# Patient Record
Sex: FEMALE | Race: WHITE | Employment: FULL TIME | URBAN - METROPOLITAN AREA
[De-identification: names, ages, dates, MRNs, and addresses within clinical notes are randomized per-mention and may not be internally consistent; named-entity substitution may affect disease eponyms.]

---

## 2018-09-16 ENCOUNTER — APPOINTMENT (OUTPATIENT)
Dept: CT IMAGING | Age: 23
End: 2018-09-16
Attending: STUDENT IN AN ORGANIZED HEALTH CARE EDUCATION/TRAINING PROGRAM
Payer: COMMERCIAL

## 2018-09-16 ENCOUNTER — HOSPITAL ENCOUNTER (EMERGENCY)
Age: 23
Discharge: HOME | End: 2018-09-16
Attending: STUDENT IN AN ORGANIZED HEALTH CARE EDUCATION/TRAINING PROGRAM
Payer: COMMERCIAL

## 2018-09-16 ENCOUNTER — APPOINTMENT (OUTPATIENT)
Dept: GENERAL RADIOLOGY | Age: 23
End: 2018-09-16
Attending: STUDENT IN AN ORGANIZED HEALTH CARE EDUCATION/TRAINING PROGRAM
Payer: COMMERCIAL

## 2018-09-16 VITALS
WEIGHT: 293 LBS | HEART RATE: 57 BPM | BODY MASS INDEX: 41.02 KG/M2 | HEIGHT: 71 IN | TEMPERATURE: 97.9 F | RESPIRATION RATE: 16 BRPM | SYSTOLIC BLOOD PRESSURE: 152 MMHG | DIASTOLIC BLOOD PRESSURE: 64 MMHG | OXYGEN SATURATION: 98 %

## 2018-09-16 DIAGNOSIS — G43.809 OTHER MIGRAINE WITHOUT STATUS MIGRAINOSUS, NOT INTRACTABLE: ICD-10-CM

## 2018-09-16 DIAGNOSIS — R55 SYNCOPE AND COLLAPSE: Primary | ICD-10-CM

## 2018-09-16 LAB
ALBUMIN SERPL-MCNC: 3.5 G/DL (ref 3.5–5)
ALBUMIN/GLOB SERPL: 0.9 {RATIO}
ALP SERPL-CCNC: 90 U/L (ref 50–130)
ALT SERPL-CCNC: 19 U/L (ref 12–65)
ANION GAP SERPL CALC-SCNC: 9 MMOL/L
AST SERPL-CCNC: 7 U/L (ref 15–37)
BILIRUB SERPL-MCNC: 0.1 MG/DL (ref 0.2–1.1)
BUN SERPL-MCNC: 12 MG/DL (ref 6–23)
CALCIUM SERPL-MCNC: 9.1 MG/DL (ref 8.3–10.4)
CHLORIDE SERPL-SCNC: 106 MMOL/L (ref 98–107)
CO2 SERPL-SCNC: 26 MMOL/L (ref 21–32)
CREAT SERPL-MCNC: 0.77 MG/DL (ref 0.6–1)
ERYTHROCYTE [DISTWIDTH] IN BLOOD BY AUTOMATED COUNT: 17.3 %
GLOBULIN SER CALC-MCNC: 3.7 G/DL (ref 2.3–3.5)
GLUCOSE SERPL-MCNC: 91 MG/DL (ref 65–100)
HCG UR QL: NEGATIVE
HCT VFR BLD AUTO: 37.5 % (ref 35.8–46.3)
HGB BLD-MCNC: 11.4 G/DL (ref 11.7–15.4)
MAGNESIUM SERPL-MCNC: 1.9 MG/DL (ref 1.8–2.4)
MCH RBC QN AUTO: 24.3 PG (ref 26.1–32.9)
MCHC RBC AUTO-ENTMCNC: 30.4 G/DL (ref 31.4–35)
MCV RBC AUTO: 80 FL (ref 79.6–97.8)
NRBC # BLD: 0 K/UL (ref 0–0.2)
PLATELET # BLD AUTO: 280 K/UL (ref 150–450)
PMV BLD AUTO: 11.2 FL (ref 9.4–12.3)
POTASSIUM SERPL-SCNC: 3.8 MMOL/L (ref 3.5–5.1)
PROT SERPL-MCNC: 7.2 G/DL
RBC # BLD AUTO: 4.69 M/UL (ref 4.05–5.2)
SODIUM SERPL-SCNC: 141 MMOL/L (ref 136–145)
TSH SERPL DL<=0.005 MIU/L-ACNC: 1.76 UIU/ML
WBC # BLD AUTO: 10.6 K/UL (ref 4.3–11.1)

## 2018-09-16 PROCEDURE — 83735 ASSAY OF MAGNESIUM: CPT

## 2018-09-16 PROCEDURE — 81003 URINALYSIS AUTO W/O SCOPE: CPT | Performed by: STUDENT IN AN ORGANIZED HEALTH CARE EDUCATION/TRAINING PROGRAM

## 2018-09-16 PROCEDURE — 85027 COMPLETE CBC AUTOMATED: CPT

## 2018-09-16 PROCEDURE — 74011250637 HC RX REV CODE- 250/637: Performed by: STUDENT IN AN ORGANIZED HEALTH CARE EDUCATION/TRAINING PROGRAM

## 2018-09-16 PROCEDURE — 80053 COMPREHEN METABOLIC PANEL: CPT

## 2018-09-16 PROCEDURE — 93005 ELECTROCARDIOGRAM TRACING: CPT | Performed by: EMERGENCY MEDICINE

## 2018-09-16 PROCEDURE — 99284 EMERGENCY DEPT VISIT MOD MDM: CPT | Performed by: STUDENT IN AN ORGANIZED HEALTH CARE EDUCATION/TRAINING PROGRAM

## 2018-09-16 PROCEDURE — 74011250636 HC RX REV CODE- 250/636: Performed by: STUDENT IN AN ORGANIZED HEALTH CARE EDUCATION/TRAINING PROGRAM

## 2018-09-16 PROCEDURE — 81025 URINE PREGNANCY TEST: CPT

## 2018-09-16 PROCEDURE — 71046 X-RAY EXAM CHEST 2 VIEWS: CPT

## 2018-09-16 PROCEDURE — 96361 HYDRATE IV INFUSION ADD-ON: CPT | Performed by: STUDENT IN AN ORGANIZED HEALTH CARE EDUCATION/TRAINING PROGRAM

## 2018-09-16 PROCEDURE — 70450 CT HEAD/BRAIN W/O DYE: CPT

## 2018-09-16 PROCEDURE — 96374 THER/PROPH/DIAG INJ IV PUSH: CPT | Performed by: STUDENT IN AN ORGANIZED HEALTH CARE EDUCATION/TRAINING PROGRAM

## 2018-09-16 PROCEDURE — 84443 ASSAY THYROID STIM HORMONE: CPT

## 2018-09-16 PROCEDURE — 72100 X-RAY EXAM L-S SPINE 2/3 VWS: CPT

## 2018-09-16 PROCEDURE — 96375 TX/PRO/DX INJ NEW DRUG ADDON: CPT | Performed by: STUDENT IN AN ORGANIZED HEALTH CARE EDUCATION/TRAINING PROGRAM

## 2018-09-16 RX ORDER — KETOROLAC TROMETHAMINE 30 MG/ML
30 INJECTION, SOLUTION INTRAMUSCULAR; INTRAVENOUS
Status: COMPLETED | OUTPATIENT
Start: 2018-09-16 | End: 2018-09-16

## 2018-09-16 RX ORDER — BUTALBITAL, ASPIRIN, AND CAFFEINE 325; 50; 40 MG/1; MG/1; MG/1
1 CAPSULE ORAL
Qty: 20 CAP | Refills: 0 | Status: SHIPPED | OUTPATIENT
Start: 2018-09-16 | End: 2018-10-31

## 2018-09-16 RX ORDER — DEXAMETHASONE SODIUM PHOSPHATE 100 MG/10ML
10 INJECTION INTRAMUSCULAR; INTRAVENOUS
Status: COMPLETED | OUTPATIENT
Start: 2018-09-16 | End: 2018-09-16

## 2018-09-16 RX ORDER — DIPHENHYDRAMINE HCL 25 MG
50 CAPSULE ORAL
Status: COMPLETED | OUTPATIENT
Start: 2018-09-16 | End: 2018-09-16

## 2018-09-16 RX ORDER — METOCLOPRAMIDE HYDROCHLORIDE 5 MG/ML
10 INJECTION INTRAMUSCULAR; INTRAVENOUS
Status: COMPLETED | OUTPATIENT
Start: 2018-09-16 | End: 2018-09-16

## 2018-09-16 RX ADMIN — KETOROLAC TROMETHAMINE 30 MG: 30 INJECTION, SOLUTION INTRAMUSCULAR at 19:57

## 2018-09-16 RX ADMIN — METOCLOPRAMIDE 10 MG: 5 INJECTION, SOLUTION INTRAMUSCULAR; INTRAVENOUS at 19:02

## 2018-09-16 RX ADMIN — DIPHENHYDRAMINE HYDROCHLORIDE 50 MG: 25 CAPSULE ORAL at 19:02

## 2018-09-16 RX ADMIN — DEXAMETHASONE SODIUM PHOSPHATE 10 MG: 10 INJECTION INTRAMUSCULAR; INTRAVENOUS at 19:02

## 2018-09-16 RX ADMIN — SODIUM CHLORIDE 1000 ML: 900 INJECTION, SOLUTION INTRAVENOUS at 19:02

## 2018-09-16 NOTE — ED PROVIDER NOTES
HPI Comments: 21-year-old female patient presents with reports of migraine headache and syncopal episode. Patient states she woke feeling normally this morning and developed some lightheadedness over the course of the morning. She states she stood up and ambulated outside. She felt very lightheaded at that point and lost consciousness falling to the ground. Patient states she fell backwards and feels she may have struck her head on a wooden deck. She woke up less than a minute later per mom's report, patient down. Reports of seizure-like episode and patient was mostly alert when she regained consciousness per mom's report. Patient describes headache prior to the episode consistent with previous migraine headaches. Patient does report some lightheadedness from her migraines in the past as well as photophobia both of which exists today. She denies previous syncopal episode. She reports normal food and fluid intake. She denies fever, chills, chest pain, shortness of breath, neck pain or stiffness. No reports of abdominal pain or changes in bowel or bladder habits. She had a menstrual cycle that was normal for her partially 2 weeks ago. Patient has a history of hypothyroidism and migraine headaches. She has been out of her medications for quite some time secondary to the lack of primary care. Patient is a 26 y.o. female presenting with syncope. The history is provided by the patient. Syncope This is a new problem. The current episode started 3 to 5 hours ago. The problem has been resolved. She lost consciousness for a period of less than one minute. The problem is associated with standing up. Associated symptoms include visual change, headaches, back pain, light-headedness and head injury.  Pertinent negatives include no chest pain, no palpitations, no clumsiness, no confusion, no diaphoresis, no fever, no malaise/fatigue, no abdominal pain, no bowel incontinence, no nausea, no vomiting, no bladder incontinence, no congestion, no dizziness, no focal weakness, no seizures, no slurred speech, no weakness, no melena and no anal bleeding. Her past medical history does not include no syncope. Past Medical History:  
Diagnosis Date  Asthma  Ill-defined condition   
 hypothyroid  Neurological disorder   
 migraines  Sleep disorder   
 sleep apnea- does not use CPAP Past Surgical History:  
Procedure Laterality Date  HX HEENT    
 tonsils and adnoids History reviewed. No pertinent family history. Social History Social History  Marital status: SINGLE Spouse name: N/A  
 Number of children: N/A  
 Years of education: N/A Occupational History  Not on file. Social History Main Topics  Smoking status: Never Smoker  Smokeless tobacco: Never Used  Alcohol use No  
 Drug use: No  
 Sexual activity: Not on file Other Topics Concern  Not on file Social History Narrative  No narrative on file ALLERGIES: Review of patient's allergies indicates no known allergies. Review of Systems Constitutional: Negative for chills, diaphoresis, fever and malaise/fatigue. HENT: Negative for congestion, sneezing and sore throat. Eyes: Negative for visual disturbance. Respiratory: Negative for cough, chest tightness, shortness of breath and wheezing. Cardiovascular: Positive for syncope. Negative for chest pain, palpitations and leg swelling. Gastrointestinal: Negative for abdominal pain, anal bleeding, blood in stool, bowel incontinence, diarrhea, melena, nausea and vomiting. Endocrine: Negative for polyuria. Genitourinary: Negative for bladder incontinence, difficulty urinating, dysuria, flank pain, hematuria and urgency. Musculoskeletal: Positive for back pain. Negative for myalgias, neck pain and neck stiffness. Skin: Negative for color change and rash. Neurological: Positive for light-headedness and headaches. Negative for dizziness, focal weakness, seizures, syncope, speech difficulty, weakness and numbness. Psychiatric/Behavioral: Negative for behavioral problems and confusion. All other systems reviewed and are negative. Vitals:  
 09/16/18 1757 BP: (!) 164/94 Pulse: 82 Resp: 16 Temp: 97.9 °F (36.6 °C) SpO2: 98% Weight: (!) 188.7 kg (416 lb) Height: 5' 11\" (1.803 m) Physical Exam  
Constitutional: She is oriented to person, place, and time. She appears well-developed and well-nourished. No distress. Alert and oriented to person, place and time. No acute distress. Speaks in clear, fluent sentences. HENT:  
Head: Normocephalic and atraumatic. Right Ear: External ear normal.  
Left Ear: External ear normal.  
Nose: Nose normal.  
Eyes: Conjunctivae and EOM are normal. Pupils are equal, round, and reactive to light. Neck: Normal range of motion. Neck supple. No JVD present. No tracheal deviation present. Cardiovascular: Normal rate, regular rhythm, S1 normal, S2 normal, normal heart sounds and intact distal pulses. Exam reveals no gallop, no distant heart sounds and no friction rub. No murmur heard. Pulmonary/Chest: Effort normal and breath sounds normal. No accessory muscle usage or stridor. No tachypnea and no bradypnea. No respiratory distress. She has no decreased breath sounds. She has no wheezes. She has no rhonchi. She has no rales. She exhibits no tenderness. Abdominal: Soft. Normal appearance. She exhibits no distension and no mass. There is no hepatosplenomegaly, splenomegaly or hepatomegaly. There is no tenderness. There is no rigidity, no rebound, no guarding, no CVA tenderness, no tenderness at McBurney's point and negative Malik's sign. Musculoskeletal: Normal range of motion. She exhibits no edema, tenderness or deformity. Neurological: She is alert and oriented to person, place, and time. She has normal strength. No cranial nerve deficit. Coordination and gait normal. GCS eye subscore is 4. GCS verbal subscore is 5. GCS motor subscore is 6. No appreciable neuro deficit. It relates to exam room without difficulty. Skin: Skin is warm and dry. No rash noted. She is not diaphoretic. Psychiatric: She has a normal mood and affect. Her behavior is normal.  
Nursing note and vitals reviewed. MDM Number of Diagnoses or Management Options Diagnosis management comments: Differential diagnosis includes migraine headache, vasovagal syncope, subarachnoid hemorrhage, arrhythmia No reports of significant cardiac disease in the immediate family. Reports sudden cardiac death per family. Patient denies history of heart disease personally. The suspicion for intracranial abnormality as cause of patient's headache or syncopal episode however she did strike her head during her fall. We will obtain CT imaging to rule out injury. EKG obtained on arrival shows a sinus bradycardia with a rate of 48 beats a minute. No acute ischemic changes are concerning arrhythmia present. CT imaging is unremarkable as well as imaging of the chest and lumbar spine. Analysis shows no evidence of infection, beta hCG negative. Discussed option for LP to fully rule out subarachnoid hemorrhage. Patient wishes to forego this test him I agree with this decision as I think her symptoms are unlikely explained by subarachnoid hemorrhage. She exhibits no focal neuro deficits and her headache is consistent with prior migraines. Amount and/or Complexity of Data Reviewed Clinical lab tests: reviewed and ordered Tests in the radiology section of CPT®: ordered and reviewed Tests in the medicine section of CPT®: reviewed and ordered Independent visualization of images, tracings, or specimens: yes Risk of Complications, Morbidity, and/or Mortality Presenting problems: moderate Diagnostic procedures: low Management options: moderate Patient Progress Patient progress: stable ED Course Procedures

## 2018-09-16 NOTE — ED TRIAGE NOTES
Patient advises that she has been having a migraine today and feeling dizzy, states she went outside and \"woke up\" on the ground. Mother was at residence and heard patient and states patient was out for about 15 seconds. Patient complaining of back pain since fall.

## 2018-09-16 NOTE — Clinical Note
Arrange follow-up care with your chosen primary care provider. Below a referral to local neurology has been provided as well. Take the medication prescribed as directed for headaches.

## 2018-09-17 LAB
ATRIAL RATE: 48 BPM
CALCULATED P AXIS, ECG09: 32 DEGREES
CALCULATED R AXIS, ECG10: 0 DEGREES
CALCULATED T AXIS, ECG11: 36 DEGREES
DIAGNOSIS, 93000: NORMAL
P-R INTERVAL, ECG05: 140 MS
Q-T INTERVAL, ECG07: 472 MS
QRS DURATION, ECG06: 98 MS
QTC CALCULATION (BEZET), ECG08: 421 MS
VENTRICULAR RATE, ECG03: 48 BPM

## 2018-09-17 NOTE — ED NOTES
I have reviewed discharge instructions with the patient. The patient verbalized understanding. Patient left ED via Discharge Method: ambulatory to Home with mother. Opportunity for questions and clarification provided. Patient given 1 scripts. Follow up reviewed. To continue your aftercare when you leave the hospital, you may receive an automated call from our care team to check in on how you are doing. This is a free service and part of our promise to provide the best care and service to meet your aftercare needs.  If you have questions, or wish to unsubscribe from this service please call [phone]. Thank you for Choosing our [facility].

## 2018-09-17 NOTE — DISCHARGE INSTRUCTIONS
Lightheadedness or Faintness: Care Instructions  Your Care Instructions  Lightheadedness is a feeling that you are about to faint or \"pass out. \" You do not feel as if you or your surroundings are moving. It is different from vertigo, which is the feeling that you or things around you are spinning or tilting. Lightheadedness usually goes away or gets better when you lie down. If lightheadedness gets worse, it can lead to a fainting spell. It is common to feel lightheaded from time to time. Lightheadedness usually is not caused by a serious problem. It often is caused by a short-lasting drop in blood pressure and blood flow to your head that occurs when you get up too quickly from a seated or lying position. Follow-up care is a key part of your treatment and safety. Be sure to make and go to all appointments, and call your doctor if you are having problems. It's also a good idea to know your test results and keep a list of the medicines you take. How can you care for yourself at home? · Lie down for 1 or 2 minutes when you feel lightheaded. After lying down, sit up slowly and remain sitting for 1 to 2 minutes before slowly standing up. · Avoid movements, positions, or activities that have made you lightheaded in the past.  · Get plenty of rest, especially if you have a cold or flu, which can cause lightheadedness. · Make sure you drink plenty of fluids, especially if you have a fever or have been sweating. · Do not drive or put yourself and others in danger while you feel lightheaded. When should you call for help? Call 911 anytime you think you may need emergency care. For example, call if:    · You have symptoms of a stroke. These may include:  ¨ Sudden numbness, tingling, weakness, or loss of movement in your face, arm, or leg, especially on only one side of your body. ¨ Sudden vision changes. ¨ Sudden trouble speaking. ¨ Sudden confusion or trouble understanding simple statements.   ¨ Sudden problems with walking or balance. ¨ A sudden, severe headache that is different from past headaches.     · You have symptoms of a heart attack. These may include:  ¨ Chest pain or pressure, or a strange feeling in the chest.  ¨ Sweating. ¨ Shortness of breath. ¨ Nausea or vomiting. ¨ Pain, pressure, or a strange feeling in the back, neck, jaw, or upper belly or in one or both shoulders or arms. ¨ Lightheadedness or sudden weakness. ¨ A fast or irregular heartbeat. After you call 911, the  may tell you to chew 1 adult-strength or 2 to 4 low-dose aspirin. Wait for an ambulance. Do not try to drive yourself.    Watch closely for changes in your health, and be sure to contact your doctor if:    · Your lightheadedness gets worse or does not get better with home care. Where can you learn more? Go to http://ysisel-jericho.info/. Enter S180 in the search box to learn more about \"Lightheadedness or Faintness: Care Instructions. \"  Current as of: November 20, 2017  Content Version: 11.7  © 5044-6799 [employer]. Care instructions adapted under license by [employer] (which disclaims liability or warranty for this information). If you have questions about a medical condition or this instruction, always ask your healthcare professional. [address] any warranty or liability for your use of this information. Migraine Headache: Care Instructions  Your Care Instructions  Migraines are painful, throbbing headaches that often start on one side of the head. They may cause nausea and vomiting and make you sensitive to light, sound, or smell. Without treatment, migraines can last from 4 hours to a few days. Medicines can help prevent migraines or stop them after they have started. Your doctor can help you find which ones work best for you. Follow-up care is a key part of your treatment and safety.  Be sure to make and go to all appointments, and call your doctor if you are having problems. It's also a good idea to know your test results and keep a list of the medicines you take. How can you care for yourself at home? · Do not drive if you have taken a prescription pain medicine. · Rest in a quiet, dark room until your headache is gone. Close your eyes, and try to relax or go to sleep. Don't watch TV or read. · Put a cold, moist cloth or cold pack on the painful area for 10 to 20 minutes at a time. Put a thin cloth between the cold pack and your skin. · Use a warm, moist towel or a heating pad set on low to relax tight shoulder and neck muscles. · Have someone gently massage your neck and shoulders. · Take your medicines exactly as prescribed. Call your doctor if you think you are having a problem with your medicine. You will get more details on the specific medicines your doctor prescribes. · Be careful not to take pain medicine more often than the instructions allow. You could get worse or more frequent headaches when the medicine wears off. To prevent migraines  · Keep a headache diary so you can figure out what triggers your headaches. Avoiding triggers may help you prevent headaches. Record when each headache began, how long it lasted, and what the pain was like. (Was it throbbing, aching, stabbing, or dull?) Write down any other symptoms you had with the headache, such as nausea, flashing lights or dark spots, or sensitivity to bright light or loud noise. Note if the headache occurred near your period. List anything that might have triggered the headache. Triggers may include certain foods (chocolate, cheese, wine) or odors, smoke, bright light, stress, or lack of sleep. · If your doctor has prescribed medicine for your migraines, take it as directed. You may have medicine that you take only when you get a migraine and medicine that you take all the time to help prevent migraines.   ¨ If your doctor has prescribed medicine for when you get a headache, take it at the first sign of a migraine, unless your doctor has given you other instructions. ¨ If your doctor has prescribed medicine to prevent migraines, take it exactly as prescribed. Call your doctor if you think you are having a problem with your medicine. · Find healthy ways to deal with stress. Migraines are most common during or right after stressful times. Take time to relax before and after you do something that has caused a migraine in the past.  · Try to keep your muscles relaxed by keeping good posture. Check your jaw, face, neck, and shoulder muscles for tension. Try to relax them. When you sit at a desk, change positions often. And make sure to stretch for 30 seconds each hour. · Get plenty of sleep and exercise. · Eat meals on a regular schedule. Avoid foods and drinks that often trigger migraines. These include chocolate, alcohol (especially red wine and port), aspartame, monosodium glutamate (MSG), and some additives found in foods (such as hot dogs, cleary, cold cuts, aged cheeses, and pickled foods). · Limit caffeine. Don't drink too much coffee, tea, or soda. But don't quit caffeine suddenly. That can also give you migraines. · Do not smoke or allow others to smoke around you. If you need help quitting, talk to your doctor about stop-smoking programs and medicines. These can increase your chances of quitting for good. · If you are taking birth control pills or hormone therapy, talk to your doctor about whether they are triggering your migraines. When should you call for help? Call 911 anytime you think you may need emergency care. For example, call if:    · You have signs of a stroke. These may include:  ¨ Sudden numbness, paralysis, or weakness in your face, arm, or leg, especially on only one side of your body. ¨ Sudden vision changes. ¨ Sudden trouble speaking. ¨ Sudden confusion or trouble understanding simple statements. ¨ Sudden problems with walking or balance.   ¨ A sudden, severe headache that is different from past headaches.    Call your doctor now or seek immediate medical care if:    · You have new or worse nausea and vomiting.     · You have a new or higher fever.     · Your headache gets much worse.    Watch closely for changes in your health, and be sure to contact your doctor if:    · You are not getting better after 2 days (48 hours). Where can you learn more? Go to http://yissel-jericho.info/. Enter K411 in the search box to learn more about \"Migraine Headache: Care Instructions. \"  Current as of: October 9, 2017  Content Version: 11.7  © 1197-1186 [employer]. Care instructions adapted under license by [employer] (which disclaims liability or warranty for this information). If you have questions about a medical condition or this instruction, always ask your healthcare professional. [address] any warranty or liability for your use of this information.

## 2018-10-31 PROBLEM — E66.01 OBESITY, MORBID (HCC): Status: ACTIVE | Noted: 2018-10-31

## 2019-04-15 PROBLEM — G47.33 OSA (OBSTRUCTIVE SLEEP APNEA): Status: ACTIVE | Noted: 2019-04-15

## 2019-04-15 PROBLEM — F33.1 MODERATE EPISODE OF RECURRENT MAJOR DEPRESSIVE DISORDER (HCC): Status: ACTIVE | Noted: 2019-04-15

## 2019-04-15 PROBLEM — F41.9 ANXIETY: Status: ACTIVE | Noted: 2019-04-15

## 2019-04-15 PROBLEM — Z86.39 HISTORY OF HYPOTHYROIDISM: Status: ACTIVE | Noted: 2019-04-15

## 2019-05-20 PROBLEM — G43.909 MIGRAINE: Status: ACTIVE | Noted: 2019-05-20

## 2019-06-14 PROBLEM — F33.1 MODERATE EPISODE OF RECURRENT MAJOR DEPRESSIVE DISORDER (HCC): Status: RESOLVED | Noted: 2019-04-15 | Resolved: 2019-06-14

## 2019-06-14 PROBLEM — F33.41 RECURRENT MAJOR DEPRESSIVE DISORDER, IN PARTIAL REMISSION (HCC): Status: ACTIVE | Noted: 2019-06-14

## 2021-02-18 ENCOUNTER — HOSPITAL ENCOUNTER (OUTPATIENT)
Age: 26
Setting detail: OBSERVATION
Discharge: HOME | End: 2021-02-20
Attending: EMERGENCY MEDICINE | Admitting: INTERNAL MEDICINE
Payer: COMMERCIAL

## 2021-02-18 ENCOUNTER — HOSPITAL ENCOUNTER (OUTPATIENT)
Dept: CT IMAGING | Age: 26
Discharge: HOME | End: 2021-02-18
Attending: FAMILY MEDICINE
Payer: COMMERCIAL

## 2021-02-18 ENCOUNTER — HOSPITAL ENCOUNTER (OUTPATIENT)
Dept: ULTRASOUND IMAGING | Age: 26
Discharge: HOME | End: 2021-02-18
Attending: FAMILY MEDICINE
Payer: COMMERCIAL

## 2021-02-18 DIAGNOSIS — E66.01 OBESITY, MORBID (HCC): ICD-10-CM

## 2021-02-18 DIAGNOSIS — I26.99 BILATERAL PULMONARY EMBOLISM (HCC): Primary | ICD-10-CM

## 2021-02-18 DIAGNOSIS — R06.09 DYSPNEA ON EFFORT: ICD-10-CM

## 2021-02-18 DIAGNOSIS — R00.0 TACHYCARDIA: ICD-10-CM

## 2021-02-18 DIAGNOSIS — M79.604 RIGHT LEG PAIN: ICD-10-CM

## 2021-02-18 DIAGNOSIS — I82.461 ACUTE DEEP VEIN THROMBOSIS (DVT) OF CALF MUSCLE VEIN OF RIGHT LOWER EXTREMITY (HCC): ICD-10-CM

## 2021-02-18 DIAGNOSIS — E66.01 MORBID OBESITY WITH BMI OF 60.0-69.9, ADULT (HCC): ICD-10-CM

## 2021-02-18 DIAGNOSIS — Z78.9 USES BIRTH CONTROL: ICD-10-CM

## 2021-02-18 DIAGNOSIS — I82.4Y1 DEEP VEIN THROMBOSIS (DVT) OF PROXIMAL VEIN OF RIGHT LOWER EXTREMITY, UNSPECIFIED CHRONICITY (HCC): ICD-10-CM

## 2021-02-18 DIAGNOSIS — N93.9 ABNORMAL UTERINE BLEEDING (AUB): ICD-10-CM

## 2021-02-18 PROBLEM — I82.401 RIGHT LEG DVT (HCC): Status: ACTIVE | Noted: 2021-02-18

## 2021-02-18 LAB
ALBUMIN SERPL-MCNC: 3.5 G/DL (ref 3.5–5)
ALBUMIN/GLOB SERPL: 0.9 {RATIO} (ref 1.2–3.5)
ALP SERPL-CCNC: 70 U/L (ref 50–136)
ALT SERPL-CCNC: 20 U/L (ref 12–65)
ANION GAP SERPL CALC-SCNC: 6 MMOL/L (ref 7–16)
APTT PPP: 28.6 SEC (ref 24.1–35.1)
AST SERPL-CCNC: 11 U/L (ref 15–37)
ATRIAL RATE: 76 BPM
BASOPHILS # BLD: 0.1 K/UL (ref 0–0.2)
BASOPHILS NFR BLD: 1 % (ref 0–2)
BILIRUB SERPL-MCNC: 0.2 MG/DL (ref 0.2–1.1)
BNP SERPL-MCNC: 82 PG/ML (ref 5–125)
BUN SERPL-MCNC: 10 MG/DL (ref 6–23)
CALCIUM SERPL-MCNC: 9.1 MG/DL (ref 8.3–10.4)
CALCULATED P AXIS, ECG09: 55 DEGREES
CALCULATED R AXIS, ECG10: -13 DEGREES
CALCULATED T AXIS, ECG11: 58 DEGREES
CHLORIDE SERPL-SCNC: 108 MMOL/L (ref 98–107)
CO2 SERPL-SCNC: 25 MMOL/L (ref 21–32)
CREAT SERPL-MCNC: 0.81 MG/DL (ref 0.6–1)
DIAGNOSIS, 93000: NORMAL
DIFFERENTIAL METHOD BLD: ABNORMAL
EOSINOPHIL # BLD: 0.3 K/UL (ref 0–0.8)
EOSINOPHIL NFR BLD: 3 % (ref 0.5–7.8)
ERYTHROCYTE [DISTWIDTH] IN BLOOD BY AUTOMATED COUNT: 15.1 % (ref 11.9–14.6)
GLOBULIN SER CALC-MCNC: 4.1 G/DL (ref 2.3–3.5)
GLUCOSE SERPL-MCNC: 86 MG/DL (ref 65–100)
HCT VFR BLD AUTO: 39 % (ref 35.8–46.3)
HGB BLD-MCNC: 12 G/DL (ref 11.7–15.4)
IMM GRANULOCYTES # BLD AUTO: 0 K/UL (ref 0–0.5)
IMM GRANULOCYTES NFR BLD AUTO: 0 % (ref 0–5)
INR PPP: 1
LYMPHOCYTES # BLD: 2.7 K/UL (ref 0.5–4.6)
LYMPHOCYTES NFR BLD: 29 % (ref 13–44)
MCH RBC QN AUTO: 25.2 PG (ref 26.1–32.9)
MCHC RBC AUTO-ENTMCNC: 30.8 G/DL (ref 31.4–35)
MCV RBC AUTO: 81.8 FL (ref 79.6–97.8)
MONOCYTES # BLD: 0.7 K/UL (ref 0.1–1.3)
MONOCYTES NFR BLD: 8 % (ref 4–12)
NEUTS SEG # BLD: 5.6 K/UL (ref 1.7–8.2)
NEUTS SEG NFR BLD: 60 % (ref 43–78)
NRBC # BLD: 0 K/UL (ref 0–0.2)
P-R INTERVAL, ECG05: 134 MS
PLATELET # BLD AUTO: 225 K/UL (ref 150–450)
PMV BLD AUTO: 11.9 FL (ref 9.4–12.3)
POTASSIUM SERPL-SCNC: 4 MMOL/L (ref 3.5–5.1)
PROT SERPL-MCNC: 7.6 G/DL (ref 6.3–8.2)
PROTHROMBIN TIME: 13.7 SEC (ref 12.5–14.7)
Q-T INTERVAL, ECG07: 368 MS
QRS DURATION, ECG06: 98 MS
QTC CALCULATION (BEZET), ECG08: 414 MS
RBC # BLD AUTO: 4.77 M/UL (ref 4.05–5.2)
SODIUM SERPL-SCNC: 139 MMOL/L (ref 136–145)
TROPONIN-HIGH SENSITIVITY: 7.8 PG/ML (ref 0–14)
UFH PPP CHRO-ACNC: 0.15 IU/ML (ref 0.3–0.7)
VENTRICULAR RATE, ECG03: 76 BPM
WBC # BLD AUTO: 9.3 K/UL (ref 4.3–11.1)

## 2021-02-18 PROCEDURE — 74011000258 HC RX REV CODE- 258: Performed by: FAMILY MEDICINE

## 2021-02-18 PROCEDURE — 84484 ASSAY OF TROPONIN QUANT: CPT

## 2021-02-18 PROCEDURE — 80053 COMPREHEN METABOLIC PANEL: CPT

## 2021-02-18 PROCEDURE — 36415 COLL VENOUS BLD VENIPUNCTURE: CPT

## 2021-02-18 PROCEDURE — 96375 TX/PRO/DX INJ NEW DRUG ADDON: CPT

## 2021-02-18 PROCEDURE — 71260 CT THORAX DX C+: CPT

## 2021-02-18 PROCEDURE — 96365 THER/PROPH/DIAG IV INF INIT: CPT

## 2021-02-18 PROCEDURE — 99285 EMERGENCY DEPT VISIT HI MDM: CPT

## 2021-02-18 PROCEDURE — 85305 CLOT INHIBIT PROT S TOTAL: CPT

## 2021-02-18 PROCEDURE — 85613 RUSSELL VIPER VENOM DILUTED: CPT

## 2021-02-18 PROCEDURE — 74011000636 HC RX REV CODE- 636: Performed by: FAMILY MEDICINE

## 2021-02-18 PROCEDURE — 85610 PROTHROMBIN TIME: CPT

## 2021-02-18 PROCEDURE — 85300 ANTITHROMBIN III ACTIVITY: CPT

## 2021-02-18 PROCEDURE — 85302 CLOT INHIBIT PROT C ANTIGEN: CPT

## 2021-02-18 PROCEDURE — 96376 TX/PRO/DX INJ SAME DRUG ADON: CPT

## 2021-02-18 PROCEDURE — 93971 EXTREMITY STUDY: CPT

## 2021-02-18 PROCEDURE — 85520 HEPARIN ASSAY: CPT

## 2021-02-18 PROCEDURE — 74011250636 HC RX REV CODE- 250/636: Performed by: EMERGENCY MEDICINE

## 2021-02-18 PROCEDURE — 93005 ELECTROCARDIOGRAM TRACING: CPT | Performed by: EMERGENCY MEDICINE

## 2021-02-18 PROCEDURE — 96366 THER/PROPH/DIAG IV INF ADDON: CPT

## 2021-02-18 PROCEDURE — 85730 THROMBOPLASTIN TIME PARTIAL: CPT

## 2021-02-18 PROCEDURE — 85025 COMPLETE CBC W/AUTO DIFF WBC: CPT

## 2021-02-18 PROCEDURE — 2709999900 HC NON-CHARGEABLE SUPPLY

## 2021-02-18 PROCEDURE — 99218 HC RM OBSERVATION: CPT

## 2021-02-18 PROCEDURE — 83880 ASSAY OF NATRIURETIC PEPTIDE: CPT

## 2021-02-18 RX ORDER — IBUPROFEN 200 MG
400 TABLET ORAL
Status: DISCONTINUED | OUTPATIENT
Start: 2021-02-18 | End: 2021-02-19

## 2021-02-18 RX ORDER — ALBUTEROL SULFATE 0.83 MG/ML
2.5 SOLUTION RESPIRATORY (INHALATION)
Status: DISCONTINUED | OUTPATIENT
Start: 2021-02-19 | End: 2021-02-19

## 2021-02-18 RX ORDER — KETOROLAC TROMETHAMINE 30 MG/ML
30 INJECTION, SOLUTION INTRAMUSCULAR; INTRAVENOUS
Status: COMPLETED | OUTPATIENT
Start: 2021-02-18 | End: 2021-02-18

## 2021-02-18 RX ORDER — HEPARIN SODIUM 5000 [USP'U]/ML
80 INJECTION, SOLUTION INTRAVENOUS; SUBCUTANEOUS ONCE
Status: COMPLETED | OUTPATIENT
Start: 2021-02-18 | End: 2021-02-18

## 2021-02-18 RX ORDER — ONDANSETRON 2 MG/ML
4 INJECTION INTRAMUSCULAR; INTRAVENOUS
Status: DISCONTINUED | OUTPATIENT
Start: 2021-02-18 | End: 2021-02-20 | Stop reason: HOSPADM

## 2021-02-18 RX ORDER — BUDESONIDE AND FORMOTEROL FUMARATE DIHYDRATE 160; 4.5 UG/1; UG/1
2 AEROSOL RESPIRATORY (INHALATION)
Status: DISCONTINUED | OUTPATIENT
Start: 2021-02-18 | End: 2021-02-20 | Stop reason: HOSPADM

## 2021-02-18 RX ORDER — OXYCODONE HYDROCHLORIDE 5 MG/1
5 TABLET ORAL
Status: DISCONTINUED | OUTPATIENT
Start: 2021-02-18 | End: 2021-02-19

## 2021-02-18 RX ORDER — SODIUM CHLORIDE 0.9 % (FLUSH) 0.9 %
5-40 SYRINGE (ML) INJECTION EVERY 8 HOURS
Status: DISCONTINUED | OUTPATIENT
Start: 2021-02-18 | End: 2021-02-20 | Stop reason: HOSPADM

## 2021-02-18 RX ORDER — HEPARIN SODIUM 5000 [USP'U]/100ML
17-36 INJECTION, SOLUTION INTRAVENOUS
Status: DISCONTINUED | OUTPATIENT
Start: 2021-02-18 | End: 2021-02-20

## 2021-02-18 RX ORDER — SODIUM CHLORIDE 0.9 % (FLUSH) 0.9 %
5-40 SYRINGE (ML) INJECTION AS NEEDED
Status: DISCONTINUED | OUTPATIENT
Start: 2021-02-18 | End: 2021-02-20 | Stop reason: HOSPADM

## 2021-02-18 RX ORDER — SODIUM CHLORIDE 0.9 % (FLUSH) 0.9 %
10 SYRINGE (ML) INJECTION
Status: COMPLETED | OUTPATIENT
Start: 2021-02-18 | End: 2021-02-18

## 2021-02-18 RX ADMIN — KETOROLAC TROMETHAMINE 30 MG: 30 INJECTION, SOLUTION INTRAMUSCULAR at 18:39

## 2021-02-18 RX ADMIN — HEPARIN SODIUM 9350 UNITS: 5000 INJECTION INTRAVENOUS; SUBCUTANEOUS at 17:42

## 2021-02-18 RX ADMIN — Medication 10 ML: at 23:11

## 2021-02-18 RX ADMIN — SODIUM CHLORIDE 100 ML: 900 INJECTION, SOLUTION INTRAVENOUS at 14:59

## 2021-02-18 RX ADMIN — Medication 10 ML: at 14:59

## 2021-02-18 RX ADMIN — IOPAMIDOL 100 ML: 755 INJECTION, SOLUTION INTRAVENOUS at 14:59

## 2021-02-18 RX ADMIN — HEPARIN SODIUM 17 UNITS/KG/HR: 5000 INJECTION, SOLUTION INTRAVENOUS at 17:42

## 2021-02-18 NOTE — ED PROVIDER NOTES
[address] [facility] Alberto Russell is a 23 y.o. female seen on 2/18/2021 in the [facility] EMERGENCY DEPT in room ERE/E. Chief Complaint   Patient presents with    Abnormal CT Scan     HPI: 26-year-old female presented emergency department after being referred by her family doctor. Patient had been complaining of 3 days of right lower extremity swelling and pain with associated exertional shortness of breath and chest tightness. Patient states that when she sits still she has no shortness of breath however she states that her left leg pain is getting worse. She says that it is throbbing and aching. Patient with no personal history of blood clots. Patient is on birth control. Patient had outpatient venous duplex and CT of her chest earlier today and was sent in due to concerning findings. Patient had a nonocclusive right distal DVT noted on duplex. Patient had significant clot burden noted on CT chest.  Patient with large distal right main pulmonary artery pulmonary embolism with multiple bilateral segmental and subsegmental clots. Patient states that she has no shortness of breath at all while sitting still. Her only complaint when sitting still is that her right lower extremity is throbbing. Historian: Patient    REVIEW OF SYSTEMS     Review of Systems   Constitutional: Negative. HENT: Negative. Respiratory: Positive for chest tightness and shortness of breath. Cardiovascular: Positive for palpitations and leg swelling. Gastrointestinal: Negative. Genitourinary: Positive for vaginal bleeding. Musculoskeletal: Negative. Neurological: Negative. Psychiatric/Behavioral: Negative. All other systems reviewed and are negative.       PAST MEDICAL HISTORY     Past Medical History:   Diagnosis Date    Asthma     Hypothyroid     Migraine     Moderate episode of recurrent major depressive disorder ([facility] [address] 4/15/2019    Sleep apnea      does not use CPAP      Past Surgical History:   Procedure Laterality Date   • HX HEENT      tonsils and adnoids     Social History     Socioeconomic History   • Marital status: SINGLE     Spouse name: Not on file   • Number of children: Not on file   • Years of education: Not on file   • Highest education level: Not on file   Tobacco Use   • Smoking status: Never Smoker   • Smokeless tobacco: Never Used   Substance and Sexual Activity   • Alcohol use: No   • Drug use: No   • Sexual activity: Yes     Partners: Male     Birth control/protection: Condom     Prior to Admission Medications   Prescriptions Last Dose Informant Patient Reported? Taking?   albuterol (PROVENTIL HFA, VENTOLIN HFA, PROAIR HFA) 90 mcg/actuation inhaler   No No   Sig: Take 1 Puff by inhalation every four (4) hours as needed for Wheezing.   fluticasone propion-salmeteroL (ADVAIR/WIXELA) 100-50 mcg/dose diskus inhaler   No No   Sig: Take 1 Puff by inhalation two (2) times a day.   frovatriptan (FROVA) 2.5 mg tablet   No No   Sig: Take 1 tab PO at onset of migraine and 1 tab PO 2 hours later if needed   tiotropium bromide (Spiriva Respimat) 1.25 mcg/actuation inhaler   No No   Sig: Take 2 Puffs by inhalation daily.      Facility-Administered Medications: None     Allergies   Allergen Reactions   • Passion Fruit Anaphylaxis     Other reaction(s): Lips/Mouth swelling-A     • Peas Hives     Other reaction(s): Hives/Swelling-A     • Shellfish Containing Products Hives     Other reaction(s): Hives/Swelling-A          PHYSICAL EXAM       Vitals:    02/18/21 1649   BP: (!) 151/93   Pulse: 84   Resp: 18   Temp: 97.8 °F (36.6 °C)   SpO2: 96%    Vital signs were reviewed.     Physical Exam  Vitals signs and nursing note reviewed.   Constitutional:       General: She is not in acute distress.     Appearance: Normal appearance. She is not toxic-appearing.   HENT:      Head: Normocephalic and atraumatic.      Nose: Nose normal.      Mouth/Throat:      Mouth: Mucous membranes  are moist.   Eyes:      Extraocular Movements: Extraocular movements intact. Pupils: Pupils are equal, round, and reactive to light. Neck:      Musculoskeletal: Normal range of motion. Cardiovascular:      Rate and Rhythm: Normal rate. Pulses: Normal pulses. Heart sounds: Normal heart sounds. Pulmonary:      Effort: Pulmonary effort is normal.   Abdominal:      Palpations: Abdomen is soft. Tenderness: There is no abdominal tenderness. Musculoskeletal:         General: Swelling and tenderness present. Right lower leg: Edema present. Neurological:      General: No focal deficit present. Mental Status: She is oriented to person, place, and time. Psychiatric:         Mood and Affect: Mood normal.         Behavior: Behavior normal.         Thought Content: Thought content normal.         Judgment: Judgment normal.          MEDICAL DECISION MAKING     ED Course:    Recent Results (from the past 8 hour(s))   AMB POC URINE PREGNANCY TEST, VISUAL COLOR COMPARISON    Collection Time: 02/18/21 11:00 AM   Result Value Ref Range    VALID INTERNAL CONTROL POC Yes     HCG urine, Ql. (POC) Negative Negative   EKG, 12 LEAD, INITIAL    Collection Time: 02/18/21  4:50 PM   Result Value Ref Range    Ventricular Rate 76 BPM    Atrial Rate 76 BPM    P-R Interval 134 ms    QRS Duration 98 ms    Q-T Interval 368 ms    QTC Calculation (Bezet) 414 ms    Calculated P Axis 55 degrees    Calculated R Axis -13 degrees    Calculated T Axis 58 degrees    Diagnosis       Sinus rhythm with marked sinus arrhythmia  Incomplete right bundle branch block  Cannot rule out Anterior infarct , age undetermined  Abnormal ECG  When compared with ECG of 16-SEP-2018 18:49,  Vent.  rate has increased BY  28 BPM  Minimal criteria for Anterior infarct are now Present     CBC WITH AUTOMATED DIFF    Collection Time: 02/18/21  4:55 PM   Result Value Ref Range    WBC 9.3 4.3 - 11.1 K/uL    RBC 4.77 4.05 - 5.2 M/uL    HGB 12.0 11.7 - 15.4 g/dL    HCT 39.0 35.8 - 46.3 %    MCV 81.8 79.6 - 97.8 FL    MCH 25.2 (L) 26.1 - 32.9 PG    MCHC 30.8 (L) 31.4 - 35.0 g/dL    RDW 15.1 (H) 11.9 - 14.6 %    PLATELET 813 522 - 533 K/uL    MPV 11.9 9.4 - 12.3 FL    ABSOLUTE NRBC 0.00 0.0 - 0.2 K/uL    DF AUTOMATED      NEUTROPHILS 60 43 - 78 %    LYMPHOCYTES 29 13 - 44 %    MONOCYTES 8 4.0 - 12.0 %    EOSINOPHILS 3 0.5 - 7.8 %    BASOPHILS 1 0.0 - 2.0 %    IMMATURE GRANULOCYTES 0 0.0 - 5.0 %    ABS. NEUTROPHILS 5.6 1.7 - 8.2 K/UL    ABS. LYMPHOCYTES 2.7 0.5 - 4.6 K/UL    ABS. MONOCYTES 0.7 0.1 - 1.3 K/UL    ABS. EOSINOPHILS 0.3 0.0 - 0.8 K/UL    ABS. BASOPHILS 0.1 0.0 - 0.2 K/UL    ABS. IMM. GRANS. 0.0 0.0 - 0.5 K/UL   METABOLIC PANEL, COMPREHENSIVE    Collection Time: 02/18/21  4:55 PM   Result Value Ref Range    Sodium 139 136 - 145 mmol/L    Potassium 4.0 3.5 - 5.1 mmol/L    Chloride 108 (H) 98 - 107 mmol/L    CO2 25 21 - 32 mmol/L    Anion gap 6 (L) 7 - 16 mmol/L    Glucose 86 65 - 100 mg/dL    BUN 10 6 - 23 MG/DL    Creatinine 0.81 0.6 - 1.0 MG/DL    GFR est AA >60 >60 ml/min/1.73m2    GFR est non-AA >60 >60 ml/min/1.73m2    Calcium 9.1 8.3 - 10.4 MG/DL    Bilirubin, total 0.2 0.2 - 1.1 MG/DL    ALT (SGPT) 20 12 - 65 U/L    AST (SGOT) 11 (L) 15 - 37 U/L    Alk. phosphatase 70 50 - 136 U/L    Protein, total 7.6 6.3 - 8.2 g/dL    Albumin 3.5 3.5 - 5.0 g/dL    Globulin 4.1 (H) 2.3 - 3.5 g/dL    A-G Ratio 0.9 (L) 1.2 - 3.5     NT-PRO BNP    Collection Time: 02/18/21  5:30 PM   Result Value Ref Range    NT pro-BNP 82 5 - 125 PG/ML   TROPONIN-HIGH SENSITIVITY    Collection Time: 02/18/21  5:30 PM   Result Value Ref Range    Troponin-High Sensitivity 7.8 0 - 14 pg/mL     Ct Chest W Cont    Addendum Date: 2/18/2021    Addendum: Discussed with Dr. Perkins Feeling by Dr. Carlos Flaherty at 3:44 PM 2/18/2021.     Result Date: 2/18/2021  EXAMINATION: CT CHEST WITH IV CONTRAST 2/18/2021 3:04 PM ACCESSION NUMBER: [id number] INDICATION: Shortness of breath on exertion tachycardia dyspnea on effort right leg pain. Protocol chest x-ray 9/16/2018 COMPARISON: None available TECHNIQUE: Multiple contiguous axial CT images of the chest were obtained from the lung apices to the lung bases after the intravenous administration of contrast material . The pulmonary embolism protocol was utilized. Coronal reconstructions were performed. Radiation dose reduction techniques were used for this study:  Our CT scanners use one or all of the following: Automated exposure control, adjustment of the mA and/or kVp according to patient's size, iterative reconstruction. FINDINGS: There are multiple bilateral pulmonary arterial defects, involving the distal right main pulmonary artery, the right lower lobar branch, and multiple segmental and subsegmental branches on the right. On the left, the pulmonary emboli involve multiple segmental and subsegmental branches in the left lower lobar branch. THORACIC AORTA: Unremarkable PROXIMAL GREAT VESSELS: Unremarkable HEART: Unremarkable PERICARDIUM: Unremarkable MEDIASTINAL LYMPH NODES: Unremarkable HILAR LYMPH NODES: Unremarkable AXILLARY LYMPH NODES: Unremarkable PULMONARY PARENCHYMA: 0.3 cm part solid nodule in the lingula. PLEURA: No pneumothorax. No pleural effusion. VISUALIZED UPPER ABDOMEN: There is no free intraperitoneal gas in the included portions of the upper abdomen. OSSEOUS STRUCTURES: No suspicious lytic or blastic bony lesions. 1. Multiple large bilateral pulmonary emboli, with the most proximal emboli in the distal right main pulmonary artery. There is involvement of multiple lobar, segmental, and subsegmental branches bilaterally. 2. No definite CT evidence of right heart strain. 3. 0.3 cm sub-solid lingular nodule, favored to be inflammatory. If this patient is not deemed high risk, no further follow-up is necessary. If this patient is deemed high risk, an optional follow-up CT in 12 months can be considered.  I personally called the CT technologist and instructed them to send the patient to the emergency room. Despite repeated attempts, I was unable to reach a person on the phone number described for [facility] for the referring physician in [address] [place] [address]. The phone number I repeatedly called was [phone]. [facility] The critical results. VOICE DICTATED BY: Dr. Karyle Pyo    Duplex Lower Ext Venous Right    Result Date: 2/18/2021  EXAMINATION: DUPLEX LOWER EXT VENOUS RIGHT 2/18/2021 4:15 PM INDICATION: Right lower extremity pain and swelling for 4 days COMPARISON: None available. TECHNIQUE: Duplex ultrasound of the right lower extremity was performed. Color Doppler and spectral waveform analysis was performed. FINDINGS: Common Femoral: Patent Greater Saphenous: Included portions patent. Femoral: Difficult to visualize in its distal portion. Popliteal: Patent Posterior Tibial: Possible nonocclusive thrombus Peroneal: Possible nonocclusive thrombus     Possible nonocclusive thrombus in the posterior tibial and peroneal veins, calf veins. ED Course as of Feb 18 1845   Thu Feb 18, 2021   1729 Discussed with Dr. Sariah Adam (Heamatology). Will obtain protein C and S, Antithrombin III and lupus anticoagulant prior to starting heparin drip    [JL]      ED Course User Index  [JL] DO LUCRETIA Martinez  Number of Diagnoses or Management Options  Acute deep vein thrombosis (DVT) of calf muscle vein of right lower extremity ([address]  Bilateral pulmonary embolism ([address]  Diagnosis management comments: 81-year-old female who is in the emergency department with exertional dyspnea there is pulmonary embolisms. Patient with no sign of right-sided heart strain at this time. Discussed with hematology and blood work ordered prior to starting her heparin drip. Patient will be admitted to the hospitalist for further treatment evaluation.        Amount and/or Complexity of Data Reviewed  Clinical lab tests: ordered and reviewed  Tests in the radiology section of CPT®: reviewed  Decide to obtain previous medical records or to obtain history from someone other than the patient: yes  Review and summarize past medical records: yes  Discuss the patient with other providers: yes  Independent visualization of images, tracings, or specimens: yes    Patient Progress  Patient progress: stable    Critical Care  Performed by: Amanda Reese DO  Authorized by: Amanda Reese DO     Critical care provider statement:     Critical care time (minutes):  38    Critical care was necessary to treat or prevent imminent or life-threatening deterioration of the following conditions:  Circulatory failure    Critical care was time spent personally by me on the following activities:  Blood draw for specimens, development of treatment plan with patient or surrogate, discussions with consultants, ordering and performing treatments and interventions, ordering and review of laboratory studies, ordering and review of radiographic studies, re-evaluation of patient's condition, review of old charts, pulse oximetry, evaluation of patient's response to treatment, examination of patient and obtaining history from patient or surrogate  Comments:      Bilateral pulmonary embolism with out cor pulmonale requiring        Disposition: Admitted  Diagnosis:     ICD-10-CM ICD-9-CM   1. Bilateral pulmonary embolism ([facility] [address]  I26.99 415.19   2. Acute deep vein thrombosis (DVT) of calf muscle vein of right lower extremity (HCC)  I82.461 453.42     ____________________________________________________________________  A portion of this note was generated using voice recognition dictation software. While the note has been reviewed for accuracy, please note certain words and phrases may not be transcribed as intended and some grammatical and/or typographical errors may be present.

## 2021-02-18 NOTE — ED TRIAGE NOTES
Patient to triage in wheel chair with mask in place from CT. Patient states she had an US and CT done today that did show blood clots in her leg and lungs. Patient complains of shortness of breath with exertion, tachycardia and leg pain since Sunday.

## 2021-02-19 LAB
ANION GAP SERPL CALC-SCNC: 12 MMOL/L (ref 7–16)
BUN SERPL-MCNC: 9 MG/DL (ref 6–23)
CALCIUM SERPL-MCNC: 9.4 MG/DL (ref 8.3–10.4)
CHLORIDE SERPL-SCNC: 110 MMOL/L (ref 98–107)
CO2 SERPL-SCNC: 20 MMOL/L (ref 21–32)
CREAT SERPL-MCNC: 0.59 MG/DL (ref 0.6–1)
ERYTHROCYTE [DISTWIDTH] IN BLOOD BY AUTOMATED COUNT: 15.4 % (ref 11.9–14.6)
GLUCOSE SERPL-MCNC: 79 MG/DL (ref 65–100)
HCT VFR BLD AUTO: 39 % (ref 35.8–46.3)
HGB BLD-MCNC: 11.8 G/DL (ref 11.7–15.4)
MCH RBC QN AUTO: 25.9 PG (ref 26.1–32.9)
MCHC RBC AUTO-ENTMCNC: 30.3 G/DL (ref 31.4–35)
MCV RBC AUTO: 85.5 FL (ref 79.6–97.8)
NRBC # BLD: 0 K/UL (ref 0–0.2)
PLATELET # BLD AUTO: 174 K/UL (ref 150–450)
PMV BLD AUTO: 12.3 FL (ref 9.4–12.3)
POTASSIUM SERPL-SCNC: 3.7 MMOL/L (ref 3.5–5.1)
RBC # BLD AUTO: 4.56 M/UL (ref 4.05–5.2)
SODIUM SERPL-SCNC: 142 MMOL/L (ref 136–145)
UFH PPP CHRO-ACNC: 0.11 IU/ML (ref 0.3–0.7)
UFH PPP CHRO-ACNC: 0.23 IU/ML (ref 0.3–0.7)
UFH PPP CHRO-ACNC: 0.58 IU/ML (ref 0.3–0.7)
WBC # BLD AUTO: 7.1 K/UL (ref 4.3–11.1)

## 2021-02-19 PROCEDURE — 74011250636 HC RX REV CODE- 250/636: Performed by: INTERNAL MEDICINE

## 2021-02-19 PROCEDURE — 74011250636 HC RX REV CODE- 250/636: Performed by: FAMILY MEDICINE

## 2021-02-19 PROCEDURE — 94640 AIRWAY INHALATION TREATMENT: CPT

## 2021-02-19 PROCEDURE — 85520 HEPARIN ASSAY: CPT

## 2021-02-19 PROCEDURE — 74011250637 HC RX REV CODE- 250/637: Performed by: FAMILY MEDICINE

## 2021-02-19 PROCEDURE — 74011250637 HC RX REV CODE- 250/637: Performed by: INTERNAL MEDICINE

## 2021-02-19 PROCEDURE — 85027 COMPLETE CBC AUTOMATED: CPT

## 2021-02-19 PROCEDURE — 74011000250 HC RX REV CODE- 250: Performed by: INTERNAL MEDICINE

## 2021-02-19 PROCEDURE — 36415 COLL VENOUS BLD VENIPUNCTURE: CPT

## 2021-02-19 PROCEDURE — 99218 HC RM OBSERVATION: CPT

## 2021-02-19 PROCEDURE — 74011250637 HC RX REV CODE- 250/637: Performed by: NURSE PRACTITIONER

## 2021-02-19 PROCEDURE — 96366 THER/PROPH/DIAG IV INF ADDON: CPT

## 2021-02-19 PROCEDURE — 2709999900 HC NON-CHARGEABLE SUPPLY

## 2021-02-19 PROCEDURE — 74011000250 HC RX REV CODE- 250: Performed by: OBSTETRICS & GYNECOLOGY

## 2021-02-19 PROCEDURE — 94760 N-INVAS EAR/PLS OXIMETRY 1: CPT

## 2021-02-19 PROCEDURE — 80048 BASIC METABOLIC PNL TOTAL CA: CPT

## 2021-02-19 RX ORDER — OXYCODONE HYDROCHLORIDE 5 MG/1
10 TABLET ORAL
Status: DISCONTINUED | OUTPATIENT
Start: 2021-02-19 | End: 2021-02-20 | Stop reason: HOSPADM

## 2021-02-19 RX ORDER — ALBUTEROL SULFATE 0.83 MG/ML
2.5 SOLUTION RESPIRATORY (INHALATION)
Status: DISCONTINUED | OUTPATIENT
Start: 2021-02-19 | End: 2021-02-20 | Stop reason: HOSPADM

## 2021-02-19 RX ORDER — HEPARIN SODIUM 5000 [USP'U]/ML
40 INJECTION, SOLUTION INTRAVENOUS; SUBCUTANEOUS ONCE
Status: COMPLETED | OUTPATIENT
Start: 2021-02-19 | End: 2021-02-19

## 2021-02-19 RX ORDER — OXYCODONE HYDROCHLORIDE 5 MG/1
5 TABLET ORAL ONCE
Status: COMPLETED | OUTPATIENT
Start: 2021-02-19 | End: 2021-02-19

## 2021-02-19 RX ORDER — HEPARIN SODIUM 5000 [USP'U]/ML
20 INJECTION, SOLUTION INTRAVENOUS; SUBCUTANEOUS ONCE
Status: COMPLETED | OUTPATIENT
Start: 2021-02-19 | End: 2021-02-19

## 2021-02-19 RX ORDER — ACETAMINOPHEN 500 MG
1000 TABLET ORAL
Status: DISCONTINUED | OUTPATIENT
Start: 2021-02-19 | End: 2021-02-20 | Stop reason: HOSPADM

## 2021-02-19 RX ADMIN — BUDESONIDE AND FORMOTEROL FUMARATE DIHYDRATE 2 PUFF: 160; 4.5 AEROSOL RESPIRATORY (INHALATION) at 20:54

## 2021-02-19 RX ADMIN — ALBUTEROL SULFATE 2.5 MG: 2.5 SOLUTION RESPIRATORY (INHALATION) at 15:58

## 2021-02-19 RX ADMIN — Medication 5 ML: at 13:06

## 2021-02-19 RX ADMIN — ALBUTEROL SULFATE 2.5 MG: 2.5 SOLUTION RESPIRATORY (INHALATION) at 00:32

## 2021-02-19 RX ADMIN — OXYCODONE 5 MG: 5 TABLET ORAL at 17:49

## 2021-02-19 RX ADMIN — OXYCODONE 5 MG: 5 TABLET ORAL at 11:26

## 2021-02-19 RX ADMIN — Medication 10 ML: at 04:54

## 2021-02-19 RX ADMIN — HEPARIN SODIUM 23 UNITS/KG/HR: 5000 INJECTION, SOLUTION INTRAVENOUS at 15:12

## 2021-02-19 RX ADMIN — OXYCODONE 5 MG: 5 TABLET ORAL at 06:08

## 2021-02-19 RX ADMIN — ALBUTEROL SULFATE 2.5 MG: 2.5 SOLUTION RESPIRATORY (INHALATION) at 13:30

## 2021-02-19 RX ADMIN — OXYCODONE 5 MG: 5 TABLET ORAL at 04:52

## 2021-02-19 RX ADMIN — OXYCODONE 5 MG: 5 TABLET ORAL at 18:35

## 2021-02-19 RX ADMIN — HEPARIN SODIUM 7600 UNITS: 5000 INJECTION INTRAVENOUS; SUBCUTANEOUS at 15:13

## 2021-02-19 RX ADMIN — HEPARIN SODIUM 19 UNITS/KG/HR: 5000 INJECTION, SOLUTION INTRAVENOUS at 06:08

## 2021-02-19 RX ADMIN — ALBUTEROL SULFATE 2.5 MG: 2.5 SOLUTION RESPIRATORY (INHALATION) at 05:03

## 2021-02-19 RX ADMIN — IBUPROFEN 400 MG: 200 TABLET, FILM COATED ORAL at 11:26

## 2021-02-19 RX ADMIN — OXYCODONE 10 MG: 5 TABLET ORAL at 23:59

## 2021-02-19 RX ADMIN — ACETAMINOPHEN 1000 MG: 500 TABLET ORAL at 15:13

## 2021-02-19 RX ADMIN — BUDESONIDE AND FORMOTEROL FUMARATE DIHYDRATE 2 PUFF: 160; 4.5 AEROSOL RESPIRATORY (INHALATION) at 09:07

## 2021-02-19 RX ADMIN — ALBUTEROL SULFATE 2.5 MG: 2.5 SOLUTION RESPIRATORY (INHALATION) at 09:07

## 2021-02-19 RX ADMIN — ALBUTEROL SULFATE 2.5 MG: 2.5 SOLUTION RESPIRATORY (INHALATION) at 20:53

## 2021-02-19 RX ADMIN — Medication 5 ML: at 21:36

## 2021-02-19 RX ADMIN — HEPARIN SODIUM 3800 UNITS: 5000 INJECTION INTRAVENOUS; SUBCUTANEOUS at 00:35

## 2021-02-19 RX ADMIN — BUDESONIDE AND FORMOTEROL FUMARATE DIHYDRATE 2 PUFF: 160; 4.5 AEROSOL RESPIRATORY (INHALATION) at 00:32

## 2021-02-19 NOTE — ED NOTES
TRANSFER - OUT REPORT:    Verbal report given to Yadira(name) on Georganna Lanes  being transferred to 70(unit) for routine progression of care       Report consisted of patients Situation, Background, Assessment and   Recommendations(SBAR). Information from the following report(s) SBAR was reviewed with the receiving nurse. Lines:   Peripheral IV 02/18/21 Left Antecubital (Active)   Site Assessment Clean, dry, & intact 02/18/21 1650   Phlebitis Assessment 0 02/18/21 1650   Infiltration Assessment 0 02/18/21 1650   Dressing Status Clean, dry, & intact 02/18/21 1650        Opportunity for questions and clarification was provided.       Patient transported with:   Registered Nurse

## 2021-02-19 NOTE — H&P
HOSPITALIST H&P      NAME:  Jonn Russell   Age:  23 y.o.  :   1995   MRN:   [id number]    PCP: Vangie Bonilla DO    Attending MD: Karolina Larson DO    Treatment Team: Attending Provider: Ángel Gill; Primary Nurse: Marco Antonio Domínguez    HPI:     Chief Complaint: Right leg pain    Georganna Lanes is a 23year old CF with a PMH of abnormal vaginal bleeding and asthma who presented to the ER with 5 days of right leg tenderness and cramping. She says she first notice her right calf being crampy and painful on 21. She continued to have this pain so she called her PCP to get an appointment. She was referred to outpatient radiology for a RLE U/S, which showed DVT. She then got a CT Chest which showed extensive clot burden so she was sent to the ER. She admits to some SHAH when asked. Denies SOB at rest. Denies CP. Denies HA. Denies N/V/D. Complete ROS done and is as stated in HPI or otherwise negative    Past Medical History:   Diagnosis Date    Asthma     Hypothyroid     Migraine     Moderate episode of recurrent major depressive disorder ([facility] [address] 4/15/2019    Sleep apnea      does not use CPAP         Past Surgical History:   Procedure Laterality Date    HX HEENT      tonsils and adnoids        Prior to Admission Medications   Prescriptions Last Dose Informant Patient Reported? Taking? albuterol (PROVENTIL HFA, VENTOLIN HFA, PROAIR HFA) 90 mcg/actuation inhaler   No No   Sig: Take 1 Puff by inhalation every four (4) hours as needed for Wheezing. fluticasone propion-salmeteroL (ADVAIR/WIXELA) 100-50 mcg/dose diskus inhaler   No No   Sig: Take 1 Puff by inhalation two (2) times a day. frovatriptan (FROVA) 2.5 mg tablet   No No   Sig: Take 1 tab PO at onset of migraine and 1 tab PO 2 hours later if needed   tiotropium bromide (Spiriva Respimat) 1.25 mcg/actuation inhaler   No No   Sig: Take 2 Puffs by inhalation daily.       Facility-Administered Medications: None       Allergies   Allergen Reactions    Passion Fruit Anaphylaxis     Other reaction(s): Lips/Mouth swelling-A      Peas Hives     Other reaction(s): Hives/Swelling-A      Shellfish Containing Products Hives     Other reaction(s): Hives/Swelling-A          Social History     Tobacco Use    Smoking status: Never Smoker    Smokeless tobacco: Never Used   Substance Use Topics    Alcohol use: No        Family History   Problem Relation Age of Onset    No Known Problems Mother     Hypertension Father         Objective:       Visit Vitals  BP (!) 149/67   Pulse 84   Temp 97.8 °F (36.6 °C)   Resp 18   Ht 5' 10\" (1.778 m)   Wt (!) 190.1 kg (419 lb)   SpO2 98%   BMI 60.12 kg/m²        Temp (24hrs), Av.8 °F (36.6 °C), Min:97.8 °F (36.6 °C), Max:97.8 °F (36.6 °C)      Oxygen Therapy  O2 Sat (%): 98 % (21)  Pulse via Oximetry: 85 beats per minute (21 182)  O2 Device: Room air (21 1649)      Physical Exam:    General:    Alert, cooperative, no distress, appears stated age. Eyes:   PERRLA EOMI Anicteric  Head:   Normocephalic, without obvious abnormality, atraumatic. ENT:  Nares normal. No drainage. Lungs:   Clear to auscultation bilaterally. No Wheezing or Rhonchi. No rales. Heart:   Regular rate and rhythm,  no murmur, rub or gallop. No JVD. Abdomen:   Soft, non-tender. Not distended. Bowel sounds normal.   MSK:  No edema. Right calf tender, positive Hci sign  Skin:     Texture, turgor normal. No rashes or lesions. No Jaundice. Neurologic: Alert and oriented x 3, no focal deficits   Psychiatry:      No depression/anxiety. Mood congruent for context. Heme/Lymph/Immune: No echymoses or overt signs of bleeding.      Lab/Data Review:  Recent Results (from the past 24 hour(s))   AMB POC URINE PREGNANCY TEST, VISUAL COLOR COMPARISON    Collection Time: 21 11:00 AM   Result Value Ref Range    VALID INTERNAL CONTROL POC Yes     HCG urine, Ql. (POC) Negative Negative   EKG, 12 LEAD, INITIAL    Collection Time: 02/18/21  4:50 PM   Result Value Ref Range    Ventricular Rate 76 BPM    Atrial Rate 76 BPM    P-R Interval 134 ms    QRS Duration 98 ms    Q-T Interval 368 ms    QTC Calculation (Bezet) 414 ms    Calculated P Axis 55 degrees    Calculated R Axis -13 degrees    Calculated T Axis 58 degrees    Diagnosis       Sinus rhythm with marked sinus arrhythmia  Incomplete right bundle branch block  Cannot rule out Anterior infarct , age undetermined  Abnormal ECG  When compared with ECG of 16-SEP-2018 18:49,  Vent. rate has increased BY  28 BPM  Minimal criteria for Anterior infarct are now Present     CBC WITH AUTOMATED DIFF    Collection Time: 02/18/21  4:55 PM   Result Value Ref Range    WBC 9.3 4.3 - 11.1 K/uL    RBC 4.77 4.05 - 5.2 M/uL    HGB 12.0 11.7 - 15.4 g/dL    HCT 39.0 35.8 - 46.3 %    MCV 81.8 79.6 - 97.8 FL    MCH 25.2 (L) 26.1 - 32.9 PG    MCHC 30.8 (L) 31.4 - 35.0 g/dL    RDW 15.1 (H) 11.9 - 14.6 %    PLATELET 734 972 - 341 K/uL    MPV 11.9 9.4 - 12.3 FL    ABSOLUTE NRBC 0.00 0.0 - 0.2 K/uL    DF AUTOMATED      NEUTROPHILS 60 43 - 78 %    LYMPHOCYTES 29 13 - 44 %    MONOCYTES 8 4.0 - 12.0 %    EOSINOPHILS 3 0.5 - 7.8 %    BASOPHILS 1 0.0 - 2.0 %    IMMATURE GRANULOCYTES 0 0.0 - 5.0 %    ABS. NEUTROPHILS 5.6 1.7 - 8.2 K/UL    ABS. LYMPHOCYTES 2.7 0.5 - 4.6 K/UL    ABS. MONOCYTES 0.7 0.1 - 1.3 K/UL    ABS. EOSINOPHILS 0.3 0.0 - 0.8 K/UL    ABS. BASOPHILS 0.1 0.0 - 0.2 K/UL    ABS. IMM.  GRANS. 0.0 0.0 - 0.5 K/UL   METABOLIC PANEL, COMPREHENSIVE    Collection Time: 02/18/21  4:55 PM   Result Value Ref Range    Sodium 139 136 - 145 mmol/L    Potassium 4.0 3.5 - 5.1 mmol/L    Chloride 108 (H) 98 - 107 mmol/L    CO2 25 21 - 32 mmol/L    Anion gap 6 (L) 7 - 16 mmol/L    Glucose 86 65 - 100 mg/dL    BUN 10 6 - 23 MG/DL    Creatinine 0.81 0.6 - 1.0 MG/DL    GFR est AA >60 >60 ml/min/1.73m2    GFR est non-AA >60 >60 ml/min/1.73m2    Calcium 9.1 8.3 - 10.4 MG/DL Bilirubin, total 0.2 0.2 - 1.1 MG/DL    ALT (SGPT) 20 12 - 65 U/L    AST (SGOT) 11 (L) 15 - 37 U/L    Alk. phosphatase 70 50 - 136 U/L    Protein, total 7.6 6.3 - 8.2 g/dL    Albumin 3.5 3.5 - 5.0 g/dL    Globulin 4.1 (H) 2.3 - 3.5 g/dL    A-G Ratio 0.9 (L) 1.2 - 3.5     PTT    Collection Time: 02/18/21  4:55 PM   Result Value Ref Range    aPTT 28.6 24.1 - 35.1 SEC   PROTHROMBIN TIME + INR    Collection Time: 02/18/21  4:55 PM   Result Value Ref Range    Prothrombin time 13.7 12.5 - 14.7 sec    INR 1.0     NT-PRO BNP    Collection Time: 02/18/21  5:30 PM   Result Value Ref Range    NT pro-BNP 82 5 - 125 PG/ML   TROPONIN-HIGH SENSITIVITY    Collection Time: 02/18/21  5:30 PM   Result Value Ref Range    Troponin-High Sensitivity 7.8 0 - 14 pg/mL       Imaging:  Ct Chest W Cont    Addendum Date: 2/18/2021    Addendum: Discussed with Dr. Duy Delgado by Dr. Bianka Powers at 3:44 PM 2/18/2021. Result Date: 2/18/2021  1. Multiple large bilateral pulmonary emboli, with the most proximal emboli in the distal right main pulmonary artery. There is involvement of multiple lobar, segmental, and subsegmental branches bilaterally. 2. No definite CT evidence of right heart strain. 3. 0.3 cm sub-solid lingular nodule, favored to be inflammatory. If this patient is not deemed high risk, no further follow-up is necessary. If this patient is deemed high risk, an optional follow-up CT in 12 months can be considered. I personally called the CT technologist and instructed them to send the patient to the emergency room. Despite repeated attempts, I was unable to reach a person on the phone number described for [place] [facility] for the referring physician in [address] [place] [address]. The phone number I repeatedly called was [phone]. [facility] The critical results. VOICE DICTATED BY: Dr. Chris Saravia    Duplex Lower Ext Venous Right    Result Date: 2/18/2021  Possible nonocclusive thrombus in the posterior tibial and peroneal veins, calf veins. Cultures:   All Micro Results     None            Assessment:     Principal Diagnosis Bilateral pulmonary embolism [facility])    Hospital Problems as of 2/18/2021 Date Reviewed: 2/18/2021          Codes Class Noted - Resolved POA    * (Principal) Bilateral pulmonary embolism ([facility] ICD-10-CM: I26.99  ICD-9-CM: 415.19  2/18/2021 - Present Yes        Right leg DVT ([facility] ICD-10-CM: I82.401  ICD-9-CM: 453.40  2/18/2021 - Present Yes        Uses birth control ICD-10-CM: Z78.9  ICD-9-CM: V49.89  2/18/2021 - Present Yes        Morbid obesity with BMI of 60.0-69.9, adult [facility]) ICD-10-CM: E66.01, Z68.44  ICD-9-CM: 278.01, V85.44  2/18/2021 - Present Yes              Plan (MDM):     Principal Problem:    Bilateral pulmonary embolism ([facility] [address] (2/18/2021)  - CT Chest with extensive clot burden, including proximal pulmonary artery with no signs of right heart strain  - PESI = 1  - Check ECHO  - Coagulation work up initiated in ER after discussion with hematology  - Continue Heparin gtt  - Change to Eliquis tomorrow after testing complete    Active Problems:    Right leg DVT ([facility] [address] (2/18/2021)  - Likely due to birth control  - Coagulation work up initiated in ER after discussion with hematology  - Continue Heparin gtt  - Change to Eliquis tomorrow after testing complete      Uses birth control (2/18/2021)  - Takes Abri - Estrogen/Progestin due to abnormal vaginal bleeding  - Will need some other form of bleeding control since will be on [facility]      Morbid obesity with BMI of 60.0-69.9, adult ([facility] [address] (2/18/2021)      Code Status: FULL CODE  DVT Prophylaxis: Heparin gtt    Anticipated discharge: 24 hours    Azeem Ross DO  7:31 PM

## 2021-02-19 NOTE — PROGRESS NOTES
Chart screened by  for potential discharge needs or concerns. None identified at this time. Please notify/consult  if any discharge needs arise. Care Management Interventions  PCP Verified by CM: Yes(can do virtual visits)  Last Visit to PCP: 02/18/21  Mode of Transport at Discharge:  Other (see comment)(family)  Discharge Durable Medical Equipment: No  Physical Therapy Consult: No  Occupational Therapy Consult: No  Speech Therapy Consult: No  Confirm Follow Up Transport: Self  Discharge Location  Discharge Placement: Home with family assistance

## 2021-02-19 NOTE — PROGRESS NOTES
Consult called by RN or unit secretary. Informed them I will come over after multip pt delivers at [facility] [place] [address] [facility]. Reviewed chart. Pt has a primary GYN at [facility], Dr. Mendoza Navas w/ [facility]. Last appt 1/5/21 for chlamydia test of cure which was negative. Last annual 10/13/2020 and was on Apri (desogestrel and ethinyl estradiol) which has been stopped now with her DVT/PEs. Plan to start high dose provera and pt will need to follow up with her primary GYN, Dr. Mendoza Navas to make further long term plan for contraception & to help with AUB that will ensue after anticoagulation.

## 2021-02-19 NOTE — PROGRESS NOTES
[facility] Hospitalist Service Progress Note      Assessment / Plan:      Bilateral pulmonary embolism ([address] (2/18/2021)  -2/18 CT Chest with extensive clot burden, including proximal pulmonary artery with no signs of right heart strain  - PESI = 1  - Check ECHO  - Coagulation work up initiated in ER after discussion with hematology  - Continue Heparin gtt  - Change to Eliquis tomorrow after testing complete     Active Problems:    Right leg DVT ([address] (2/18/2021)  - Likely due to birth control  -2/18 Coagulation work up initiated in [facility] after discussion with hematology  -2/18 Continue Heparin gtt  - Change to Xarelto (Xarelto better OAC in obese patients) after gyn sees patient       Uses birth control (2/18/2021)  - Takes Abri - Estrogen/Progestin due to abnormal vaginal bleeding  - Will need some other form of bleeding control since will be on [address]  -2/18 Gyn consult placed     AUB  2/19 gyn consult      Morbid obesity with BMI of 60.0-69.9, adult ([address] (2/18/2021)       Chief Complaint : Abnormal CT Scan      Subjective:  23year old CF with a PMH of abnormal vaginal bleeding and asthma admitted 2/18 for PE/DVT. 2/19:  Patient alert and oriented x3. Denies CP or SOB. States vaginal bleeding \"spotting\" now. Objective:  Visit Vitals  BP (!) 150/78 (BP 1 Location: Right lower arm, BP Patient Position: At rest)   Pulse 81   Temp 98.6 °F (37 °C)   Resp 14   Ht 5' 10\" (1.778 m)   Wt (!) 190.1 kg (419 lb)   SpO2 95%   BMI 60.12 kg/m²                 Physical Exam:  General:          Alert, cooperative, no distress, appears stated age. Morbid obese. Eyes:               PERRLA EOMI Anicteric  Head:               Normocephalic, without obvious abnormality, atraumatic. ENT:                Nares normal. No drainage. Lungs:             Clear to auscultation bilaterally. No Wheezing or Rhonchi. No rales. Heart:              Regular rate and rhythm,  no murmur, rub or gallop. No JVD. Abdomen:        Soft, non-tender. Not distended. Bowel sounds normal.   MSK:               No edema. Right calf tender, positive Chi sign  Skin:                Texture, turgor normal. No rashes or lesions. No Jaundice. Neurologic:      Alert and oriented x 3, no focal deficits   Psychiatry:      No depression/anxiety. Mood congruent for context. Heme/Lymph/Immune: No echymoses or overt signs of bleeding. Intake and Output:  Date 02/18/21 0700 - 02/19/21 0659 02/19/21 0700 - 02/20/21 0659   Shift [id number] 1900-0659 24 Hour Total 9331-7143 9317-4291 24 Hour Total   INTAKE   Shift Total(mL/kg)         OUTPUT   Urine(mL/kg/hr)           Urine Occurrence(s)  1 x 1 x      Shift Total(mL/kg)         NET         Weight (kg) 190.1 190.1 190.1 190.1 190.1 190.1       LAB:  Admission on 02/18/2021   Component Date Value    WBC 02/18/2021 9.3     RBC 02/18/2021 4.77     HGB 02/18/2021 12.0     HCT 02/18/2021 39.0     MCV 02/18/2021 81.8     MCH 02/18/2021 25.2*    MCHC 02/18/2021 30.8*    RDW 02/18/2021 15.1*    PLATELET 86/90/5551 362     MPV 02/18/2021 11.9     ABSOLUTE NRBC 02/18/2021 0.00     DF 02/18/2021 AUTOMATED     NEUTROPHILS 02/18/2021 60     LYMPHOCYTES 02/18/2021 29     MONOCYTES 02/18/2021 8     EOSINOPHILS 02/18/2021 3     BASOPHILS 02/18/2021 1     IMMATURE GRANULOCYTES 02/18/2021 0     ABS. NEUTROPHILS 02/18/2021 5.6     ABS. LYMPHOCYTES 02/18/2021 2.7     ABS. MONOCYTES 02/18/2021 0.7     ABS. EOSINOPHILS 02/18/2021 0.3     ABS. BASOPHILS 02/18/2021 0.1     ABS. IMM. GRANS. 02/18/2021 0.0     Sodium 02/18/2021 139     Potassium 02/18/2021 4.0     Chloride 02/18/2021 108*    CO2 02/18/2021 25     Anion gap 02/18/2021 6*    Glucose 02/18/2021 86     BUN 02/18/2021 10     Creatinine 02/18/2021 0.81     GFR est AA 02/18/2021 >60     GFR est non-AA 02/18/2021 >60     Calcium 02/18/2021 9.1     Bilirubin, total 02/18/2021 0.2     ALT (SGPT) 02/18/2021 20     AST (SGOT) 02/18/2021 11*    Alk.  phosphatase 02/18/2021 70     Protein, total 02/18/2021 7.6     Albumin 02/18/2021 3.5     Globulin 02/18/2021 4.1*    A-G Ratio 02/18/2021 0.9*    Ventricular Rate 02/18/2021 76     Atrial Rate 02/18/2021 76     P-R Interval 02/18/2021 134     QRS Duration 02/18/2021 98     Q-T Interval 02/18/2021 368     QTC Calculation (Bezet) 02/18/2021 414     Calculated P Axis 02/18/2021 55     Calculated R Axis 02/18/2021 -13     Calculated T Axis 02/18/2021 58     Diagnosis 02/18/2021                      Value:Sinus rhythm with marked sinus arrhythmia  Incomplete right bundle branch block  Cannot rule out Anterior infarct , age undetermined  Abnormal ECG  When compared with ECG of 16-SEP-2018 18:49,  Vent. rate has increased BY  28 BPM  Minimal criteria for Anterior infarct are now Present  Confirmed by Olesya Poe ([id number]) on 2/18/2021 7:51:24 PM      NT pro-BNP 02/18/2021 82     aPTT 02/18/2021 28.6     Prothrombin time 02/18/2021 13.7     INR 02/18/2021 1.0     Troponin-High Sensitivity 02/18/2021 7.8     Heparin Xa UFH 02/18/2021 0.15*    Sodium 02/19/2021 142     Potassium 02/19/2021 3.7     Chloride 02/19/2021 110*    CO2 02/19/2021 20*    Anion gap 02/19/2021 12     Glucose 02/19/2021 79     BUN 02/19/2021 9     Creatinine 02/19/2021 0.59*    GFR est AA 02/19/2021 >60     GFR est non-AA 02/19/2021 >60     Calcium 02/19/2021 9.4     WBC 02/19/2021 7.1     RBC 02/19/2021 4.56     HGB 02/19/2021 11.8     HCT 02/19/2021 39.0     MCV 02/19/2021 85.5     MCH 02/19/2021 25.9*    MCHC 02/19/2021 30.3*    RDW 02/19/2021 15.4*    PLATELET 44/00/7302 931     MPV 02/19/2021 12.3     ABSOLUTE NRBC 02/19/2021 0.00     Heparin Xa UFH 02/19/2021 0.23*       IMAGING:  Ct Chest W Cont    Addendum Date: 2/18/2021    Addendum: Discussed with Dr. Nkechi Arriaga by Dr. Gab Beverly at 3:44 PM 2/18/2021. Result Date: 2/18/2021  1.  Multiple large bilateral pulmonary emboli, with the most proximal emboli in the distal right main pulmonary artery. There is involvement of multiple lobar, segmental, and subsegmental branches bilaterally. 2. No definite CT evidence of right heart strain. 3. 0.3 cm sub-solid lingular nodule, favored to be inflammatory. If this patient is not deemed high risk, no further follow-up is necessary. If this patient is deemed high risk, an optional follow-up CT in 12 months can be considered. I personally called the CT technologist and instructed them to send the patient to the emergency room. Despite repeated attempts, I was unable to reach a person on the phone number described for [facility] medicine for the referring physician in [address] [place] [address]. The phone number I repeatedly called was [phone]. [facility] The critical results. VOICE DICTATED BY: Dr. Dino Dill    Duplex Lower Ext Venous Right    Result Date: 2/18/2021  Possible nonocclusive thrombus in the posterior tibial and peroneal veins, calf veins. EKG:  No results found for this or any previous visit.           Allyson Easley NP  2/19/2021 12:16 PM

## 2021-02-20 VITALS
SYSTOLIC BLOOD PRESSURE: 148 MMHG | HEIGHT: 70 IN | HEART RATE: 98 BPM | OXYGEN SATURATION: 97 % | DIASTOLIC BLOOD PRESSURE: 80 MMHG | RESPIRATION RATE: 16 BRPM | TEMPERATURE: 97.3 F | BODY MASS INDEX: 41.95 KG/M2 | WEIGHT: 293 LBS

## 2021-02-20 LAB
ANION GAP SERPL CALC-SCNC: 6 MMOL/L (ref 7–16)
BASOPHILS # BLD: 0.1 K/UL (ref 0–0.2)
BASOPHILS NFR BLD: 1 % (ref 0–2)
BUN SERPL-MCNC: 8 MG/DL (ref 6–23)
CALCIUM SERPL-MCNC: 9.1 MG/DL (ref 8.3–10.4)
CHLORIDE SERPL-SCNC: 111 MMOL/L (ref 98–107)
CO2 SERPL-SCNC: 25 MMOL/L (ref 21–32)
CREAT SERPL-MCNC: 0.66 MG/DL (ref 0.6–1)
DIFFERENTIAL METHOD BLD: ABNORMAL
EOSINOPHIL # BLD: 0.1 K/UL (ref 0–0.8)
EOSINOPHIL NFR BLD: 2 % (ref 0.5–7.8)
ERYTHROCYTE [DISTWIDTH] IN BLOOD BY AUTOMATED COUNT: 15.5 % (ref 11.9–14.6)
GLUCOSE SERPL-MCNC: 95 MG/DL (ref 65–100)
HCT VFR BLD AUTO: 33.7 % (ref 35.8–46.3)
HGB BLD-MCNC: 10.3 G/DL (ref 11.7–15.4)
IMM GRANULOCYTES # BLD AUTO: 0 K/UL (ref 0–0.5)
IMM GRANULOCYTES NFR BLD AUTO: 0 % (ref 0–5)
LYMPHOCYTES # BLD: 2.8 K/UL (ref 0.5–4.6)
LYMPHOCYTES NFR BLD: 41 % (ref 13–44)
MCH RBC QN AUTO: 25.7 PG (ref 26.1–32.9)
MCHC RBC AUTO-ENTMCNC: 30.6 G/DL (ref 31.4–35)
MCV RBC AUTO: 84 FL (ref 79.6–97.8)
MONOCYTES # BLD: 0.5 K/UL (ref 0.1–1.3)
MONOCYTES NFR BLD: 8 % (ref 4–12)
NEUTS SEG # BLD: 3.4 K/UL (ref 1.7–8.2)
NEUTS SEG NFR BLD: 49 % (ref 43–78)
NRBC # BLD: 0 K/UL (ref 0–0.2)
PLATELET # BLD AUTO: 162 K/UL (ref 150–450)
PMV BLD AUTO: 11.7 FL (ref 9.4–12.3)
POTASSIUM SERPL-SCNC: 3.9 MMOL/L (ref 3.5–5.1)
PROT S AG ACT/NOR PPP IA: 81 % (ref 60–150)
PROT S FREE AG ACT/NOR PPP IA: 122 % (ref 57–157)
RBC # BLD AUTO: 4.01 M/UL (ref 4.05–5.2)
SODIUM SERPL-SCNC: 142 MMOL/L (ref 136–145)
UFH PPP CHRO-ACNC: 0.49 IU/ML (ref 0.3–0.7)
UFH PPP CHRO-ACNC: 0.52 IU/ML (ref 0.3–0.7)
WBC # BLD AUTO: 6.9 K/UL (ref 4.3–11.1)

## 2021-02-20 PROCEDURE — 85520 HEPARIN ASSAY: CPT

## 2021-02-20 PROCEDURE — C8929 TTE W OR WO FOL WCON,DOPPLER: HCPCS

## 2021-02-20 PROCEDURE — 85025 COMPLETE CBC W/AUTO DIFF WBC: CPT

## 2021-02-20 PROCEDURE — 74011250637 HC RX REV CODE- 250/637: Performed by: NURSE PRACTITIONER

## 2021-02-20 PROCEDURE — 96366 THER/PROPH/DIAG IV INF ADDON: CPT

## 2021-02-20 PROCEDURE — 74011250636 HC RX REV CODE- 250/636: Performed by: INTERNAL MEDICINE

## 2021-02-20 PROCEDURE — 99218 HC RM OBSERVATION: CPT

## 2021-02-20 PROCEDURE — 74011250636 HC RX REV CODE- 250/636: Performed by: FAMILY MEDICINE

## 2021-02-20 PROCEDURE — 80048 BASIC METABOLIC PNL TOTAL CA: CPT

## 2021-02-20 PROCEDURE — 36415 COLL VENOUS BLD VENIPUNCTURE: CPT

## 2021-02-20 PROCEDURE — 74011000250 HC RX REV CODE- 250: Performed by: FAMILY MEDICINE

## 2021-02-20 PROCEDURE — 74011250637 HC RX REV CODE- 250/637: Performed by: FAMILY MEDICINE

## 2021-02-20 RX ORDER — MEDROXYPROGESTERONE ACETATE 10 MG/1
30 TABLET ORAL DAILY
Status: DISCONTINUED | OUTPATIENT
Start: 2021-02-20 | End: 2021-02-20 | Stop reason: HOSPADM

## 2021-02-20 RX ORDER — RIVAROXABAN 15 MG-20MG
KIT ORAL
Qty: 1 DOSE PACK | Refills: 0 | Status: SHIPPED | OUTPATIENT
Start: 2021-02-20 | End: 2021-04-28 | Stop reason: DRUGHIGH

## 2021-02-20 RX ORDER — TRAMADOL HYDROCHLORIDE 50 MG/1
50 TABLET ORAL
Qty: 12 TAB | Refills: 0 | Status: SHIPPED | OUTPATIENT
Start: 2021-02-20 | End: 2021-02-23

## 2021-02-20 RX ORDER — MEDROXYPROGESTERONE ACETATE 10 MG/1
30 TABLET ORAL DAILY
Qty: 90 TAB | Refills: 0 | Status: SHIPPED | OUTPATIENT
Start: 2021-02-21 | End: 2021-03-23

## 2021-02-20 RX ADMIN — HEPARIN SODIUM 23 UNITS/KG/HR: 5000 INJECTION, SOLUTION INTRAVENOUS at 07:00

## 2021-02-20 RX ADMIN — Medication 10 ML: at 04:52

## 2021-02-20 RX ADMIN — MEDROXYPROGESTERONE ACETATE 30 MG: 10 TABLET ORAL at 09:14

## 2021-02-20 RX ADMIN — OXYCODONE 10 MG: 5 TABLET ORAL at 09:21

## 2021-02-20 RX ADMIN — Medication 10 ML: at 04:51

## 2021-02-20 RX ADMIN — OXYCODONE 10 MG: 5 TABLET ORAL at 04:52

## 2021-02-20 RX ADMIN — PERFLUTREN 1 ML: 6.52 INJECTION, SUSPENSION INTRAVENOUS at 08:15

## 2021-02-20 RX ADMIN — RIVAROXABAN 15 MG: 15 TABLET, FILM COATED ORAL at 09:13

## 2021-02-20 RX ADMIN — HEPARIN SODIUM 23 UNITS/KG/HR: 5000 INJECTION, SOLUTION INTRAVENOUS at 04:46

## 2021-02-20 NOTE — DISCHARGE INSTRUCTIONS
Take Provera as directed  Take Xarelto as prescribed  Follow up with PCP this week and Dr. Markel Zhou next available appointment

## 2021-02-20 NOTE — DISCHARGE SUMMARY
[facility]    Patient ID:  Edwar Russell. female. 1995.  [id number]    Admit date: 2/18/2021  4:54 PM    Discharge date: 02/20/21     Admitting Physician: Lloyd Meraz DO  Attending Physician: Melanie Sierra MD  Primary Care Physician: Michelle Moreno DO     Discharge Physician: Raine Metcalf NP    Discharged Condition: Stable    Indication for Admission:   Chief Complaint   Patient presents with    Abnormal CT Scan        Reason for hospitalization:   Patient Active Problem List   Diagnosis Code    Obesity, morbid ([facility] E66.01    ADELSO (obstructive sleep apnea) G47.33    Anxiety F41.9    History of hypothyroidism Z86.39    Migraine G43.909    Recurrent major depressive disorder, in partial remission ([address] F33.41    Bilateral pulmonary embolism (HCC) I26.99    Right leg DVT ([facility] I82.401    Morbid obesity with BMI of 60.0-69.9, adult ([facility] E66.01, Z68.44    Uses birth control Z78.9       Discharge Diagnosis: acute PE/DVT right leg, AUB  Discharge Disposition: stable  Follow up Instructions: Follow up with PCP this week and Dr. Aydin Kahn next available appointment  Did Patient have Sepsis (YES OR NO): No    Hospital Course:   23 y.o. female PMH of abnormal vaginal bleeding and asthma admitted 2/18 for PE/DVT.      Assessment and Plan:  Bilateral pulmonary embolism ([address] (2/18/2021)  -2/18 CT Chest with extensive clot burden, including proximal pulmonary artery with no signs of right heart strain  - PESI = 1  - Check ECHO  - Coagulation work up initiated in ER after discussion with hematology  - Continue Heparin gtt  2/20 Xarelto ordered, Heparin stopped     Active Problems:    Right leg DVT ([address] (2/18/2021)  - Likely due to birth control  -2/18 Coagulation work up initiated in ER after discussion with hematology  -2/18 Continue Heparin gtt  2/20 Xarelto ordered, Heparin stopped         Uses birth control (2/18/2021)  - Takes Abri - Estrogen/Progestin due to abnormal vaginal bleeding  - Will need some other form of bleeding control since will be on [address]  -2/19 Gyn consult placed, Seen by Gyn, felt PE/DVT related to obesity and sedentary lifestyle. 2/20  Provera ordered 30 mg daily for 30 days. Patient understands to follow up with [address] [facility]reras [facility] [address] next available appointment to discuss Progesterone IUD.     AUB  2/19 gyn consult,  Seen by Gyn, felt PE/DVT related to obesity and sedentary lifestyle       Morbid obesity with BMI of 60.0-69.9, adult ([facility] [address] (2/18/2021)    **Xarelto better option due to patient weight.     Discharge Exam:  Visit Vitals  BP (!) 158/79 (BP 1 Location: Right lower arm, BP Patient Position: At rest)   Pulse 95   Temp 98.1 °F (36.7 °C)   Resp 15   Ht 5' 10\" (1.778 m)   Wt (!) 190.1 kg (419 lb)   SpO2 98%   BMI 60.12 kg/m²      General: No acute distress, speaking in full sentences, no use of accessory muscles, morbid obesity   HEENT: Pupils equal and reactive to light and accommodation, oropharynx is clear   Neck: Supple, no lymphadenopathy, no JVD   Lungs: Clear to auscultation bilaterally   Cardiovascular: Regular rate and rhythm with normal S1 and S2   Abdomen: Soft, nontender, nondistended, normoactive bowel sounds   Extremities: No cyanosis clubbing or edema   Neuro: Nonfocal, A&O x3   Psych: Normal affect     Consults: IP CONSULT TO OB GYN    Code Status: Full Code    Significant Diagnostic Studies:   CMP:   Lab Results   Component Value Date/Time     02/20/2021 04:11 AM    K 3.9 02/20/2021 04:11 AM     (H) 02/20/2021 04:11 AM    CO2 25 02/20/2021 04:11 AM    AGAP 6 (L) 02/20/2021 04:11 AM    GLU 95 02/20/2021 04:11 AM    BUN 8 02/20/2021 04:11 AM    CREA 0.66 02/20/2021 04:11 AM    GFRAA >60 02/20/2021 04:11 AM    GFRNA >60 02/20/2021 04:11 AM    CA 9.1 02/20/2021 04:11 AM         CBC:    Lab Results   Component Value Date/Time    WBC 6.9 02/20/2021 04:11 AM    HGB 10.3 (L) 02/20/2021 04:11 AM    HCT 33.7 (L) 02/20/2021 04:11 AM     02/20/2021 04:11 AM       Lab Results   Component Value Date/Time    INR 1.0 02/18/2021 04:55 PM    Prothrombin time 13.7 02/18/2021 04:55 PM       ABG:  No results found for: PH, PHI, PCO2, PCO2I, PO2, PO2I, HCO3, HCO3I, FIO2, FIO2I        No results found for: CPK, RCK1, RCK2, RCK3, RCK4, CKMB, CKNDX, CKND1, TROPT, TROIQ, BNPP, BNP        Radiology Reports :   [unfilled]    EKG:  EKG Results     Procedure 720 Value Units Date/Time    EKG 12 LEAD INITIAL [[id number]] Collected: 02/18/21 1650    Order Status: Completed Updated: 02/18/21 1951     Ventricular Rate 76 BPM      Atrial Rate 76 BPM      P-R Interval 134 ms      QRS Duration 98 ms      Q-T Interval 368 ms      QTC Calculation (Bezet) 414 ms      Calculated P Axis 55 degrees      Calculated R Axis -13 degrees      Calculated T Axis 58 degrees      Diagnosis --     Sinus rhythm with marked sinus arrhythmia  Incomplete right bundle branch block  Cannot rule out Anterior infarct , age undetermined  Abnormal ECG  When compared with ECG of 16-SEP-2018 18:49,  Vent. rate has increased BY  28 BPM  Minimal criteria for Anterior infarct are now Present  Confirmed by Khai Oliva ([id number]) on 2/18/2021 7:51:24 PM      EKG, 12 LEAD, INITIAL [[id number]]     Order Status: Completed           Discharge Medications:  Current Discharge Medication List      START taking these medications    Details   medroxyPROGESTERone (PROVERA) 10 mg tablet Take 3 Tabs by mouth daily for 30 days. Qty: 90 Tab, Refills: 0      rivaroxaban (Xarelto DVT-PE Treat 30d Start) 15 mg (42)- 20 mg (9) DsPk Take one 15 mg tablet twice a day with food for the first 21 days. Then, take one 20 mg tablet once a day with food for 9 days. Qty: 1 Dose Pack, Refills: 0      traMADoL (ULTRAM) 50 mg tablet Take 1 Tab by mouth every six (6) hours as needed for Pain for up to 3 days. Max Daily Amount: 200 mg.   Qty: 12 Tab, Refills: 0    Associated Diagnoses: Deep vein thrombosis (DVT) of proximal vein of right lower extremity, unspecified chronicity ([address]         CONTINUE these medications which have NOT CHANGED    Details   fluticasone propion-salmeteroL (ADVAIR/WIXELA) 100-50 mcg/dose diskus inhaler Take 1 Puff by inhalation two (2) times a day. Qty: 1 Inhaler, Refills: 0    Associated Diagnoses: Moderate persistent asthma with acute exacerbation      albuterol (PROVENTIL HFA, VENTOLIN HFA, PROAIR HFA) 90 mcg/actuation inhaler Take 1 Puff by inhalation every four (4) hours as needed for Wheezing. Qty: 1 Inhaler, Refills: 2    Associated Diagnoses: Moderate persistent asthma with acute exacerbation      tiotropium bromide (Spiriva Respimat) 1.25 mcg/actuation inhaler Take 2 Puffs by inhalation daily. Qty: 1 Inhaler, Refills: 2    Associated Diagnoses: Moderate persistent asthma without complication      frovatriptan (FROVA) 2.5 mg tablet Take 1 tab PO at onset of migraine and 1 tab PO 2 hours later if needed  Qty: 20 Tab, Refills: 5    Comments: Cancel Maxalt  Associated Diagnoses: Other migraine without status migrainosus, not intractable             Medications Discontinued during this hospitalization:   Medications Discontinued During This Encounter   Medication Reason    ibuprofen (MOTRIN) tablet 400 mg     tiotropium (SPIRIVA) inhalation capsule 18 mcg REORDER    oxyCODONE IR (ROXICODONE) tablet 5 mg     albuterol (PROVENTIL VENTOLIN) nebulizer solution 2.5 mg     heparin 25,000 units in dextrose 500 mL infusion        Patient Instructions: Take Provera as directed  Take Xarelto as prescribed  Follow up with PCP this week and Dr. Carlos A Cox next available appointment    Activity: as tolerated. Diet:   DIET REGULAR    Therapy Ordered:  No therapy plan of the specified type found. Follow-up appointments:   Follow-up Appointments   Procedures    FOLLOW UP VISIT Appointment in: 3 - 5 Days PCP this week GYN next available appointment     PCP this week  GYN next available appointment Standing Status:   Standing     Number of Occurrences:   1     Order Specific Question:   Appointment in     Answer:   3 - 5 Days       Time Spent on Discharge greater than 30 minutes.     Eduin Loredo NP  2/20/2021 9:32 AM

## 2021-02-20 NOTE — CONSULTS
Gynecology Consult    Name: Jocy Dooley [facility] MRN: [id number] SSN: [id number]    YOB: 1995  Age: 23 y.o. Sex: female       Subjective:      Chief complaint / Reason for Consult:  \"PEs and DVTs on birth control with vaginal bleeding\"     Giuseppe Heart is a 23 y.o. [de-identified]  female with a BMI > 60 admitted with DVT/PE after being sent for imaging by her PCP due to SHAH and RLE pain. Pt denies any GYN complaints at this time other than what she describes is a minimal amount of vaginal bleeding currently. Pt's primary GYN is Dr. Chris Lizarraga with [facility]) and she has been managed well on Apri (desogestrel and ethinyl estradiol) which pt states regulated her periods well until this past month. She states she has had bleeding off and on this past month but it is not heavy at this time. She has stopped her OCPs since being diagnosed with DVT/PE. GYN hx significant for chlamydia 10/2020 found at her annual exam. She had a negative test of cure with Dr. Edita Warner on Jan 5, 2021. Menstrual hx: 12-12yo at onset, irregular, moderate flow. States OCPs 3yr ago regulated her cycles well. Past Medical History:   Diagnosis Date    Asthma     Hypothyroid     Migraine     Moderate episode of recurrent major depressive disorder ([facility] 4/15/2019    Sleep apnea      does not use CPAP      Past Surgical History:   Procedure Laterality Date    HX HEENT      tonsils and adnoids     OB History    No obstetric history on file.        Allergies   Allergen Reactions    Passion Fruit Anaphylaxis     Other reaction(s): Lips/Mouth swelling-A      Peas Hives     Other reaction(s): Hives/Swelling-A      Shellfish Containing Products Hives     Other reaction(s): Hives/Swelling-A       Current Facility-Administered Medications   Medication Dose Route Frequency Provider Last Rate Last Admin    acetaminophen (TYLENOL) tablet 1,000 mg  1,000 mg Oral Q6H PRN Ema Vee NP   1,000 mg at 02/19/21 1513    [START ON 2/20/2021] tiotropium bromide (SPIRIVA RESPIMAT) 2.5 mcg /actuation  2 Puff Inhalation DAILY Shante Key MD        oxyCODONE IR (ROXICODONE) tablet 10 mg  10 mg Oral Q4H PRN Shante Key MD        albuterol (PROVENTIL VENTOLIN) nebulizer solution 2.5 mg  2.5 mg Nebulization Q6HWA RT Sue Blanc MD        heparin 25,000 units in dextrose 500 mL infusion  17-36 Units/kg/hr (Adjusted) IntraVENous TITRATE Hany Smith, DO 53.9 mL/hr at 02/19/21 1853 23 Units/kg/hr at 02/19/21 1853    budesonide-formoteroL (SYMBICORT) 160-4.5 mcg/actuation HFA inhaler 2 Puff  2 Puff Inhalation BID RT Schuyler MAGUIRE, DO   2 Puff at 02/19/21 1597    sodium chloride (NS) flush 5-40 mL  5-40 mL IntraVENous Q8H [place] jacob[place] E, DO   5 mL at 02/19/21 1306    sodium chloride (NS) flush 5-40 mL  5-40 mL IntraVENous PRN Maurice, Blount E, DO        ondansetron (ZOFRAN) injection 4 mg  4 mg IntraVENous Q4H PRN [place], Blount E, DO           Family History   Problem Relation Age of Onset    No Known Problems Mother     Hypertension Father      Social History     Socioeconomic History    Marital status: SINGLE     Spouse name: Not on file    Number of children: Not on file    Years of education: Not on file    Highest education level: Not on file   Occupational History    Not on file   Social Needs    Financial resource strain: Not on file    Food insecurity     Worry: Not on file     Inability: Not on file    Transportation needs     Medical: Not on file     Non-medical: Not on file   Tobacco Use    Smoking status: Never Smoker    Smokeless tobacco: Never Used   Substance and Sexual Activity    Alcohol use: No    Drug use: No    Sexual activity: Yes     Partners: Male     Birth control/protection: Condom   Lifestyle    Physical activity     Days per week: Not on file     Minutes per session: Not on file    Stress: Not on file   Relationships    Social connections     Talks on phone: Not on file     Gets together: Not on file     Attends Oriental orthodox service: Not on file     Active member of club or organization: Not on file     Attends meetings of clubs or organizations: Not on file     Relationship status: Not on file    Intimate partner violence     Fear of current or ex partner: Not on file     Emotionally abused: Not on file     Physically abused: Not on file     Forced sexual activity: Not on file   Other Topics Concern    Not on file   Social History Narrative    Not on file       Review of Systems:  A comprehensive review of systems was negative except for that written in the History of Present Illness. Objective:     Vitals:    02/19/21 1148 02/19/21 1332 02/19/21 1559 02/19/21 1648   BP: (!) 150/78   (!) 143/80   Pulse: 81   82   Resp:    18   Temp: 98.6 °F (37 °C)   97.7 °F (36.5 °C)   SpO2:  93% 94% 95%   Weight:       Height:           General:  alert, cooperative, no distress, appears stated age, morbidly obese   Skin:  Normal.   Eyes: Grossly normal on exam. PERRL, Sclera nonicteric. Mouth: Grossly normal on exam   Lymph Nodes:  Not examined   Lungs:  Normal respiratory effort   Heart:  regular rate   Abdomen: Soft, nontender, obese   CVA:  Not examined   Genitourinary: defer exam - pt declined due to lack of significant bleeding and recent gyn exam last month   Extremities:  RLE with positive Homans sign, no erythema, no warmth when compared with LLE. Gross visual assessment of LEs shows equal in size without significant edema   Neurologic:  Grossly normal. Oriented x 3. No focal deficits. Psychiatric:  Normal mood and affect. Normal speech and judgement appears appropriate. Review of imaging: No gyn imaging this admission. No evidence of prior gyn imaging from review of records. Assessment:     23yo WF with Class III obesity, now with DVT/PE and recent AUB.     Plan:     Discussed pt's risk factors for DVT/PE of which the MOST significant is her BMI over 60, in addition to her use of OCPs, as well as relative sedentary job at this time working for [employer] meaning that she sits at a desk for approximately 8 hours a day. Discussed with her that she should no longer take the Apri and my recommendation is for her to be placed on high dose provera (30 mg daily) to combat potential heavy vaginal bleeding if she is placed on Xarelto tomorrow. Recommend pt be given a prescription for one month's worth of provera at discharge with plans for pt to follow up with her primary Gyn, Dr. Michael Estrada to discuss placement of a progesterone IUD for endometrial protection and contraception as well as to treat her predicated future iatrogenic-induced AUB-heavy from anticoagulation. Pt expressed understanding of this plan and is in agreement with it. Risks, benefits and alternatives to progesterone IUD discussed at length. Nexplanon is another option but given her size, direct progesterone to the uterus in the form of an IUD is most likely more beneficial. GYN-specific health risks associated with her significant obesity including but not limited to the increased lifetime risk of endometrial cancer was discussed. Pt reports that her current weight is actually lcose to 100lbs less than her prior. She has cut out sodas this past year and dropped the weight due to that. Praised her weight loss efforts and encouraged her to keep them up. Discussed that perhaps timed breaks for walking while working or even a stand-up desk may be beneficial to reducing her risks from being sedentary. Pt and her mother expressed understanding of all counseling and recommendations for her care. Discussed with them that I will sign off and am happy to be re-consulted if necessary.     30 minutes was spent in face to face time on the unit today with the patient and her mother (from [phone] to [phone]), examining the patient, reviewing her medical and gyn history, discussing her multiple risk factors for VTE and discussing options and recommendations for treatment both acute and when follows up with her primary GYN. 10-15 minutes of additional time was spent reviewing the chart immediately after the consult was called to me this afternoon.

## 2021-02-20 NOTE — PROGRESS NOTES
Patient is up for discharge today. Patient will be getting discharged home with no needs at this time. CM made aware that patient needs Xarelto coupon do to price of rx. Free 30 day supply and $10 refill coupon provided to patient. Care Management Interventions  PCP Verified by CM: Yes(can do virtual visits)  Last Visit to PCP: 02/18/21  Mode of Transport at Discharge:  Other (see comment)(family)  Discharge Durable Medical Equipment: No  Physical Therapy Consult: No  Occupational Therapy Consult: No  Speech Therapy Consult: No  Confirm Follow Up Transport: Self  Discharge Location  Discharge Placement: Home with family assistance

## 2021-02-20 NOTE — PROGRESS NOTES
Problem: Deep Venous Thrombosis - Risk of  Goal: *Absence of deep venous thrombosis signs and symptoms(Stroke Metric)  Outcome: Resolved/Met  Goal: *Absence of impaired coagulation signs and symptoms  Outcome: Resolved/Met  Goal: *Knowledge of prescribed medications  Outcome: Resolved/Met  Goal: *Absence of bleeding  Outcome: Resolved/Met     Problem: Patient Education: Go to Patient Education Activity  Goal: Patient/Family Education  Outcome: Resolved/Met     Problem: Pulmonary Embolism Care Plan (Adult)  Goal: *Improvement of existing pulmonary embolism  Outcome: Resolved/Met  Goal: *Absence of bleeding  Outcome: Resolved/Met  Goal: *Labs within defined limits  Outcome: Resolved/Met     Problem: Patient Education: Go to Patient Education Activity  Goal: Patient/Family Education  Outcome: Resolved/Met

## 2021-02-21 LAB
AT III PPP CHRO-ACNC: 105 % (ref 75–135)
PROT C AG PPP IA-ACNC: 114 % (ref 60–150)

## 2021-02-22 LAB
DRVVT MIX, 117894: 45.4 SEC (ref 0–40.4)
DRVVT RATIO 500585: 1.8 RATIO (ref 0.8–1.2)
INTERPRETATION, 117893: ABNORMAL
SCREEN APTT: 49.4 SEC (ref 0–51.9)
SCREEN DRVVT: 94.3 SEC (ref 0–47)

## 2021-02-25 PROBLEM — J45.20 MILD INTERMITTENT ASTHMA WITHOUT COMPLICATION: Status: ACTIVE | Noted: 2021-02-25

## 2021-02-25 PROBLEM — F33.41 RECURRENT MAJOR DEPRESSIVE DISORDER, IN PARTIAL REMISSION (HCC): Status: RESOLVED | Noted: 2019-06-14 | Resolved: 2021-02-25

## 2021-02-25 PROBLEM — F41.9 ANXIETY: Status: RESOLVED | Noted: 2019-04-15 | Resolved: 2021-02-25

## 2021-02-25 PROBLEM — Z78.9 USES BIRTH CONTROL: Status: RESOLVED | Noted: 2021-02-18 | Resolved: 2021-02-25

## 2021-04-28 ENCOUNTER — APPOINTMENT (OUTPATIENT)
Dept: CT IMAGING | Age: 26
End: 2021-04-28
Attending: EMERGENCY MEDICINE
Payer: COMMERCIAL

## 2021-04-28 ENCOUNTER — HOSPITAL ENCOUNTER (EMERGENCY)
Age: 26
Discharge: HOME | End: 2021-04-28
Attending: EMERGENCY MEDICINE | Admitting: INTERNAL MEDICINE
Payer: COMMERCIAL

## 2021-04-28 VITALS
TEMPERATURE: 98.4 F | BODY MASS INDEX: 41.95 KG/M2 | DIASTOLIC BLOOD PRESSURE: 82 MMHG | SYSTOLIC BLOOD PRESSURE: 148 MMHG | WEIGHT: 293 LBS | HEIGHT: 70 IN | OXYGEN SATURATION: 97 % | RESPIRATION RATE: 16 BRPM | HEART RATE: 82 BPM

## 2021-04-28 DIAGNOSIS — U07.1 COVID-19: Primary | ICD-10-CM

## 2021-04-28 LAB
ANION GAP SERPL CALC-SCNC: 7 MMOL/L (ref 7–16)
ARTERIAL PATENCY WRIST A: POSITIVE
ARTERIAL PATENCY WRIST A: POSITIVE
ATRIAL RATE: 90 BPM
BASE DEFICIT BLD-SCNC: 1.4 MMOL/L
BASE DEFICIT BLD-SCNC: 1.9 MMOL/L
BDY SITE: ABNORMAL
BDY SITE: ABNORMAL
BUN SERPL-MCNC: 5 MG/DL (ref 6–23)
CALCIUM SERPL-MCNC: 9.1 MG/DL (ref 8.3–10.4)
CALCULATED P AXIS, ECG09: 60 DEGREES
CALCULATED R AXIS, ECG10: 3 DEGREES
CALCULATED T AXIS, ECG11: 67 DEGREES
CHLORIDE SERPL-SCNC: 107 MMOL/L (ref 98–107)
CO2 SERPL-SCNC: 25 MMOL/L (ref 21–32)
COVID-19 RAPID TEST, COVR: DETECTED
CREAT SERPL-MCNC: 0.77 MG/DL (ref 0.6–1)
DIAGNOSIS, 93000: NORMAL
DIFFERENTIAL METHOD BLD: ABNORMAL
ERYTHROCYTE [DISTWIDTH] IN BLOOD BY AUTOMATED COUNT: 43.9 %
GAS FLOW.O2 O2 DELIVERY SYS: ABNORMAL L/MIN
GAS FLOW.O2 O2 DELIVERY SYS: ABNORMAL L/MIN
GLUCOSE SERPL-MCNC: 96 MG/DL (ref 65–100)
HCO3 BLD-SCNC: 21.6 MMOL/L (ref 22–26)
HCO3 BLD-SCNC: 22.1 MMOL/L (ref 22–26)
HCT VFR BLD AUTO: 39.8 % (ref 35.8–46.3)
HGB BLD-MCNC: 12 G/DL (ref 11.7–15.4)
MCH RBC QN AUTO: 24.5 PG (ref 26.1–32.9)
MCHC RBC AUTO-ENTMCNC: 30.9 G/DL (ref 31.4–35)
MCV RBC AUTO: 79.3 FL (ref 79.6–97.8)
NRBC # BLD: 0 K/UL
P-R INTERVAL, ECG05: 136 MS
PCO2 BLD: 32.5 MMHG (ref 35–45)
PCO2 BLD: 32.7 MMHG (ref 35–45)
PH BLD: 7.43 [PH] (ref 7.35–7.45)
PH BLD: 7.44 [PH] (ref 7.35–7.45)
PLATELET # BLD AUTO: 178 K/UL
PMV BLD AUTO: 10.7 FL (ref 9.4–12.3)
PO2 BLD: 70 MMHG (ref 75–100)
PO2 BLD: 81 MMHG (ref 75–100)
POTASSIUM SERPL-SCNC: 4.1 MMOL/L (ref 3.5–5.1)
Q-T INTERVAL, ECG07: 328 MS
QRS DURATION, ECG06: 96 MS
QTC CALCULATION (BEZET), ECG08: 401 MS
RBC # BLD AUTO: 4.89 M/UL
SAO2 % BLD: 94.6 % (ref 95–98)
SAO2 % BLD: 96.3 % (ref 95–98)
SARS-COV-2, COV2: NORMAL
SERVICE CMNT-IMP: ABNORMAL
SERVICE CMNT-IMP: ABNORMAL
SODIUM SERPL-SCNC: 139 MMOL/L (ref 136–145)
SOURCE, COVRS: ABNORMAL
SPECIMEN TYPE: ABNORMAL
SPECIMEN TYPE: ABNORMAL
TROPONIN-HIGH SENSITIVITY: 4.9 PG/ML (ref 0–14)
VENTRICULAR RATE, ECG03: 90 BPM
WBC # BLD AUTO: 3 K/UL (ref 4.3–11.1)

## 2021-04-28 PROCEDURE — 84484 ASSAY OF TROPONIN QUANT: CPT

## 2021-04-28 PROCEDURE — 82803 BLOOD GASES ANY COMBINATION: CPT

## 2021-04-28 PROCEDURE — 80048 BASIC METABOLIC PNL TOTAL CA: CPT

## 2021-04-28 PROCEDURE — 74011000636 HC RX REV CODE- 636: Performed by: EMERGENCY MEDICINE

## 2021-04-28 PROCEDURE — 74011000258 HC RX REV CODE- 258: Performed by: EMERGENCY MEDICINE

## 2021-04-28 PROCEDURE — 74011000250 HC RX REV CODE- 250: Performed by: EMERGENCY MEDICINE

## 2021-04-28 PROCEDURE — 36600 WITHDRAWAL OF ARTERIAL BLOOD: CPT

## 2021-04-28 PROCEDURE — 71260 CT THORAX DX C+: CPT

## 2021-04-28 PROCEDURE — 74011250636 HC RX REV CODE- 250/636: Performed by: EMERGENCY MEDICINE

## 2021-04-28 PROCEDURE — 99285 EMERGENCY DEPT VISIT HI MDM: CPT

## 2021-04-28 PROCEDURE — 94640 AIRWAY INHALATION TREATMENT: CPT

## 2021-04-28 PROCEDURE — 94760 N-INVAS EAR/PLS OXIMETRY 1: CPT

## 2021-04-28 PROCEDURE — 87635 SARS-COV-2 COVID-19 AMP PRB: CPT

## 2021-04-28 PROCEDURE — 85025 COMPLETE CBC W/AUTO DIFF WBC: CPT

## 2021-04-28 PROCEDURE — 93005 ELECTROCARDIOGRAM TRACING: CPT | Performed by: EMERGENCY MEDICINE

## 2021-04-28 PROCEDURE — 96374 THER/PROPH/DIAG INJ IV PUSH: CPT

## 2021-04-28 RX ORDER — DEXAMETHASONE 6 MG/1
6 TABLET ORAL
Qty: 10 TAB | Refills: 0 | Status: SHIPPED | OUTPATIENT
Start: 2021-04-28 | End: 2021-05-08

## 2021-04-28 RX ORDER — SODIUM CHLORIDE 0.9 % (FLUSH) 0.9 %
10 SYRINGE (ML) INJECTION
Status: COMPLETED | OUTPATIENT
Start: 2021-04-28 | End: 2021-04-28

## 2021-04-28 RX ORDER — IPRATROPIUM BROMIDE AND ALBUTEROL SULFATE 2.5; .5 MG/3ML; MG/3ML
3 SOLUTION RESPIRATORY (INHALATION)
Status: COMPLETED | OUTPATIENT
Start: 2021-04-28 | End: 2021-04-28

## 2021-04-28 RX ADMIN — SODIUM CHLORIDE 100 ML: 900 INJECTION, SOLUTION INTRAVENOUS at 11:09

## 2021-04-28 RX ADMIN — IPRATROPIUM BROMIDE AND ALBUTEROL SULFATE 3 ML: .5; 3 SOLUTION RESPIRATORY (INHALATION) at 10:05

## 2021-04-28 RX ADMIN — METHYLPREDNISOLONE SODIUM SUCCINATE 125 MG: 125 INJECTION, POWDER, FOR SOLUTION INTRAMUSCULAR; INTRAVENOUS at 11:54

## 2021-04-28 RX ADMIN — Medication 10 ML: at 11:09

## 2021-04-28 RX ADMIN — IOPAMIDOL 100 ML: 755 INJECTION, SOLUTION INTRAVENOUS at 11:09

## 2021-04-28 NOTE — ED TRIAGE NOTES
From PCP office with c/o Kelvin with recent PE on xarelto. Pt arrives to ED via POV and ambulates to stretcher. 96% room air sat on arrival. Low grade fever present at 99.9 F.

## 2021-04-28 NOTE — ED NOTES
I have reviewed discharge instructions with the patient. The patient verbalized understanding. Patient left ED via Discharge Method: ambulatory to Home with mother. Opportunity for questions and clarification provided. Patient given 1 scripts. To continue your aftercare when you leave the hospital, you may receive an automated call from our care team to check in on how you are doing. This is a free service and part of our promise to provide the best care and service to meet your aftercare needs.  If you have questions, or wish to unsubscribe from this service please call [phone]. Thank you for Choosing our [address] [place] [address] Emergency Department.

## 2021-04-28 NOTE — Clinical Note
Patient Class[de-identified] OBSERVATION [164]   Type of Bed: Medical [8]   Reason for Observation: covid   Admitting Diagnosis: [id number] [[id number]]   Admitting Physician: Rey Rosenberg [[id number]]   Attending Physician: Rey Rosenberg [[id number]]

## 2021-04-28 NOTE — ED PROVIDER NOTES
Patient presents emerge department from [address] [place] [address] with history of hypoxemia. Oxygen saturation of 88% on room air noted in the doctor's office today. The patient has a history of DVT and PE and is currently taking Xarelto and reports increasing difficulty breathing and dyspnea on exertion over the past several days. She has been using albuterol inhalers and nebulizer treatments without improvement. She denies any chest pain pressure tightness, patient's mother thought that she looked flushed and possibly febrile yesterday. The history is provided by the patient and medical records. Shortness of Breath  This is a new problem. The average episode lasts 3 days. The current episode started more than 2 days ago. The problem has not changed since onset. Associated symptoms include cough and wheezing. Pertinent negatives include no fever, no headaches, no coryza, no rhinorrhea, no sore throat, no swollen glands, no ear pain, no neck pain, no sputum production, no hemoptysis, no PND, no orthopnea, no chest pain, no syncope, no vomiting, no abdominal pain, no rash, no leg pain, no leg swelling and no claudication. It is unknown what precipitated the problem. She has tried nothing for the symptoms. The treatment provided no relief. She has had prior hospitalizations. She has had prior ED visits. She has had no prior ICU admissions. Associated medical issues include asthma, PE and DVT.         Past Medical History:   Diagnosis Date    Asthma     DVT (deep venous thrombosis) ([facility] [address] 02/18/2021    Hypothyroid     Migraine     Moderate episode of recurrent major depressive disorder ([facility] [address] 4/15/2019    Pulmonary emboli ([facility] [address] 02/18/2021    Sleep apnea      does not use CPAP        Past Surgical History:   Procedure Laterality Date    HX HEENT      tonsils and adnoids         Family History:   Problem Relation Age of Onset    No Known Problems Mother     Hypertension Father        Social History     Socioeconomic History    Marital status: SINGLE     Spouse name: Not on file    Number of children: Not on file    Years of education: Not on file    Highest education level: Not on file   Occupational History    Not on file   Social Needs    Financial resource strain: Not on file    Food insecurity     Worry: Not on file     Inability: Not on file    Transportation needs     Medical: Not on file     Non-medical: Not on file   Tobacco Use    Smoking status: Never Smoker    Smokeless tobacco: Never Used   Substance and Sexual Activity    Alcohol use: No    Drug use: No    Sexual activity: Yes     Partners: Male     Birth control/protection: Condom   Lifestyle    Physical activity     Days per week: Not on file     Minutes per session: Not on file    Stress: Not on file   Relationships    Social connections     Talks on phone: Not on file     Gets together: Not on file     Attends Islam service: Not on file     Active member of club or organization: Not on file     Attends meetings of clubs or organizations: Not on file     Relationship status: Not on file    Intimate partner violence     Fear of current or ex partner: Not on file     Emotionally abused: Not on file     Physically abused: Not on file     Forced sexual activity: Not on file   Other Topics Concern    Not on file   Social History Narrative    Not on file         ALLERGIES: Passion fruit, Peas, and Shellfish containing products    Review of Systems   Constitutional: Negative for fever. HENT: Negative for ear pain, rhinorrhea and sore throat. Respiratory: Positive for cough, shortness of breath and wheezing. Negative for hemoptysis and sputum production. Cardiovascular: Negative for chest pain, orthopnea, claudication, leg swelling, syncope and PND. Gastrointestinal: Negative for abdominal pain and vomiting. Musculoskeletal: Negative for neck pain. Skin: Negative for rash. Neurological: Negative for headaches.    All other systems reviewed and are negative. Vitals:    04/28/21 0925   BP: (!) 164/77   Pulse: (!) 113   Resp: 18   Temp: 99.9 °F (37.7 °C)   SpO2: 96%   Weight: (!) 174.6 kg (385 lb)   Height: 5' 10\" (1.778 m)            Physical Exam  Vitals signs and nursing note reviewed. Constitutional:       General: She is not in acute distress. Appearance: Normal appearance. She is obese. She is not ill-appearing, toxic-appearing or diaphoretic. HENT:      Head: Normocephalic and atraumatic. Nose: Nose normal. No congestion. Mouth/Throat:      Mouth: Mucous membranes are moist.      Pharynx: Oropharynx is clear. Eyes:      Extraocular Movements: Extraocular movements intact. Conjunctiva/sclera: Conjunctivae normal.      Pupils: Pupils are equal, round, and reactive to light. Neck:      Musculoskeletal: Normal range of motion and neck supple. Cardiovascular:      Rate and Rhythm: Regular rhythm. Tachycardia present. Pulses: Normal pulses. Heart sounds: Normal heart sounds. Pulmonary:      Effort: Pulmonary effort is normal.      Breath sounds: Wheezing present. Abdominal:      General: There is no distension. Palpations: Abdomen is soft. Tenderness: There is no abdominal tenderness. There is no guarding or rebound. Musculoskeletal: Normal range of motion. Skin:     General: Skin is warm and dry. Capillary Refill: Capillary refill takes less than 2 seconds. Neurological:      General: No focal deficit present. Mental Status: She is alert and oriented to person, place, and time. Mental status is at baseline. Psychiatric:         Mood and Affect: Mood normal.         Behavior: Behavior normal.         Thought Content: Thought content normal.          MDM  Number of Diagnoses or Management Options  Diagnosis management comments: Patient presenting with hypoxia with a history of asthma as well as prior pulmonary embolism.   Noted slight elevation of temperature and some mild tachycardia. May be experiencing infectious process, or exacerbation of asthma, will also need to rule out recurrence of PE. Amount and/or Complexity of Data Reviewed  Clinical lab tests: ordered and reviewed  Tests in the radiology section of CPT®: ordered and reviewed  Review and summarize past medical records: yes  Independent visualization of images, tracings, or specimens: yes (EKG at 9:38 AM: Normal sinus rhythm, rate of 90, normal EKG)    Risk of Complications, Morbidity, and/or Mortality  Presenting problems: high  Diagnostic procedures: moderate  Management options: moderate  General comments: Patient was exercised at bedside with continuous pulse oximetry which did not drop lower than 93%. Test results were discussed with the patient. The CAT scan demonstrates resolution of the pulmonary emboli for which she is currently being treated. Does show changes within the lung fields consistent with pneumonitis secondary to Covid. Reevaluation demonstrates the patient's wheezing has resolved after nebulized DuoNeb. Patient appears clinically stable for discharge and outpatient management.     Patient Progress  Patient progress: stable         Procedures

## 2021-05-13 PROBLEM — U07.1 COVID-19: Status: RESOLVED | Noted: 2021-04-28 | Resolved: 2021-05-13

## 2021-05-13 PROBLEM — Z86.718 HISTORY OF DVT (DEEP VEIN THROMBOSIS): Status: ACTIVE | Noted: 2021-02-18

## 2021-05-13 PROBLEM — Z86.711 HISTORY OF PULMONARY EMBOLUS (PE): Status: ACTIVE | Noted: 2021-02-18

## 2021-06-09 ENCOUNTER — APPOINTMENT (OUTPATIENT)
Dept: ULTRASOUND IMAGING | Age: 26
End: 2021-06-09
Attending: EMERGENCY MEDICINE
Payer: COMMERCIAL

## 2021-06-09 ENCOUNTER — HOSPITAL ENCOUNTER (EMERGENCY)
Age: 26
Discharge: HOME | End: 2021-06-10
Attending: EMERGENCY MEDICINE
Payer: COMMERCIAL

## 2021-06-09 VITALS
OXYGEN SATURATION: 97 % | WEIGHT: 293 LBS | TEMPERATURE: 98.1 F | SYSTOLIC BLOOD PRESSURE: 127 MMHG | BODY MASS INDEX: 41.95 KG/M2 | RESPIRATION RATE: 18 BRPM | DIASTOLIC BLOOD PRESSURE: 61 MMHG | HEIGHT: 70 IN | HEART RATE: 86 BPM

## 2021-06-09 DIAGNOSIS — R60.9 PERIPHERAL EDEMA: Primary | ICD-10-CM

## 2021-06-09 DIAGNOSIS — L03.119 CELLULITIS OF LOWER EXTREMITY, UNSPECIFIED LATERALITY: ICD-10-CM

## 2021-06-09 LAB
ALBUMIN SERPL-MCNC: 3.1 G/DL (ref 3.5–5)
ALBUMIN/GLOB SERPL: 0.8 {RATIO} (ref 1.2–3.5)
ALP SERPL-CCNC: 86 U/L (ref 50–136)
ALT SERPL-CCNC: 20 U/L (ref 12–65)
ANION GAP SERPL CALC-SCNC: 7 MMOL/L (ref 7–16)
AST SERPL-CCNC: 24 U/L (ref 15–37)
BASOPHILS # BLD: 0 K/UL (ref 0–0.2)
BASOPHILS NFR BLD: 1 % (ref 0–2)
BILIRUB SERPL-MCNC: 0.2 MG/DL (ref 0.2–1.1)
BNP SERPL-MCNC: 205 PG/ML (ref 5–125)
BUN SERPL-MCNC: 9 MG/DL (ref 6–23)
CALCIUM SERPL-MCNC: 8.6 MG/DL (ref 8.3–10.4)
CHLORIDE SERPL-SCNC: 111 MMOL/L (ref 98–107)
CO2 SERPL-SCNC: 25 MMOL/L (ref 21–32)
CREAT SERPL-MCNC: 0.65 MG/DL (ref 0.6–1)
DIFFERENTIAL METHOD BLD: ABNORMAL
EOSINOPHIL # BLD: 0.1 K/UL (ref 0–0.8)
EOSINOPHIL NFR BLD: 1 % (ref 0.5–7.8)
ERYTHROCYTE [DISTWIDTH] IN BLOOD BY AUTOMATED COUNT: 17 % (ref 11.9–14.6)
GLOBULIN SER CALC-MCNC: 3.7 G/DL (ref 2.3–3.5)
GLUCOSE SERPL-MCNC: 95 MG/DL (ref 65–100)
HCT VFR BLD AUTO: 34.4 % (ref 35.8–46.3)
HGB BLD-MCNC: 10.6 G/DL (ref 11.7–15.4)
IMM GRANULOCYTES # BLD AUTO: 0 K/UL (ref 0–0.5)
IMM GRANULOCYTES NFR BLD AUTO: 0 % (ref 0–5)
LYMPHOCYTES # BLD: 3 K/UL (ref 0.5–4.6)
LYMPHOCYTES NFR BLD: 38 % (ref 13–44)
MCH RBC QN AUTO: 24.9 PG (ref 26.1–32.9)
MCHC RBC AUTO-ENTMCNC: 30.8 G/DL (ref 31.4–35)
MCV RBC AUTO: 80.9 FL (ref 79.6–97.8)
MONOCYTES # BLD: 0.5 K/UL (ref 0.1–1.3)
MONOCYTES NFR BLD: 7 % (ref 4–12)
NEUTS SEG # BLD: 4.2 K/UL (ref 1.7–8.2)
NEUTS SEG NFR BLD: 53 % (ref 43–78)
NRBC # BLD: 0 K/UL (ref 0–0.2)
PLATELET # BLD AUTO: 277 K/UL (ref 150–450)
PMV BLD AUTO: 11.2 FL (ref 9.4–12.3)
POTASSIUM SERPL-SCNC: 4 MMOL/L (ref 3.5–5.1)
PROT SERPL-MCNC: 6.8 G/DL (ref 6.3–8.2)
RBC # BLD AUTO: 4.25 M/UL (ref 4.05–5.2)
SODIUM SERPL-SCNC: 143 MMOL/L (ref 136–145)
WBC # BLD AUTO: 8 K/UL (ref 4.3–11.1)

## 2021-06-09 PROCEDURE — 99283 EMERGENCY DEPT VISIT LOW MDM: CPT

## 2021-06-09 PROCEDURE — 80053 COMPREHEN METABOLIC PANEL: CPT

## 2021-06-09 PROCEDURE — 85025 COMPLETE CBC W/AUTO DIFF WBC: CPT

## 2021-06-09 PROCEDURE — 83880 ASSAY OF NATRIURETIC PEPTIDE: CPT

## 2021-06-09 PROCEDURE — 74011250637 HC RX REV CODE- 250/637: Performed by: EMERGENCY MEDICINE

## 2021-06-09 PROCEDURE — 93970 EXTREMITY STUDY: CPT

## 2021-06-09 RX ORDER — SODIUM CHLORIDE 0.9 % (FLUSH) 0.9 %
5-10 SYRINGE (ML) INJECTION AS NEEDED
Status: DISCONTINUED | OUTPATIENT
Start: 2021-06-09 | End: 2021-06-10 | Stop reason: HOSPADM

## 2021-06-09 RX ORDER — HYDROCODONE BITARTRATE AND ACETAMINOPHEN 5; 325 MG/1; MG/1
1 TABLET ORAL
Status: COMPLETED | OUTPATIENT
Start: 2021-06-09 | End: 2021-06-09

## 2021-06-09 RX ORDER — SODIUM CHLORIDE 0.9 % (FLUSH) 0.9 %
5-10 SYRINGE (ML) INJECTION EVERY 8 HOURS
Status: DISCONTINUED | OUTPATIENT
Start: 2021-06-09 | End: 2021-06-10 | Stop reason: HOSPADM

## 2021-06-09 RX ORDER — CEPHALEXIN 500 MG/1
500 CAPSULE ORAL 3 TIMES DAILY
Qty: 21 CAPSULE | Refills: 0 | Status: SHIPPED | OUTPATIENT
Start: 2021-06-09 | End: 2021-06-16

## 2021-06-09 RX ADMIN — HYDROCODONE BITARTRATE AND ACETAMINOPHEN 1 TABLET: 5; 325 TABLET ORAL at 23:53

## 2021-06-10 NOTE — ED PROVIDER NOTES
73-year-old white female was diagnosed with right lower extremity DVT and pulmonary embolism in February of this year. It was felt to be related to birth control pills. She was placed on Xarelto. Approximately 1 month ago while being evaluated for Covid she underwent chest CT which showed resolution of her PE. For this reason primary care discontinued Xarelto after 3 months. Patient now presents with 2 days of pain and swelling to her legs. States it began in the right leg and then moved to the left. She did recently travel to [place] and back. No chest pain or shortness of breath. Has had mild cough which she attributed to weather change. The history is provided by the patient. Leg Swelling   Pertinent negatives include no back pain and no neck pain.         Past Medical History:   Diagnosis Date    Asthma     DVT (deep venous thrombosis) ([facility] [address] 02/18/2021    Hypothyroid     Migraine     Moderate episode of recurrent major depressive disorder ([facility] [address] 4/15/2019    Pulmonary [facility]oli ([facility] 02/18/2021    Sleep apnea      does not use CPAP        Past Surgical History:   Procedure Laterality Date    HX HEENT      tonsils and adnoids         Family History:   Problem Relation Age of Onset    No Known Problems Mother     Hypertension Father        Social History     Socioeconomic History    Marital status: SINGLE     Spouse name: Not on file    Number of children: Not on file    Years of education: Not on file    Highest education level: Not on file   Occupational History    Not on file   Tobacco Use    Smoking status: Never Smoker    Smokeless tobacco: Never Used   Vaping Use    Vaping Use: Never used   Substance and Sexual Activity    Alcohol use: No    Drug use: No    Sexual activity: Yes     Partners: Male     Birth control/protection: Condom   Other Topics Concern    Not on file   Social History Narrative    Not on file     Social Determinants of Health     Financial Resource Strain:  Difficulty of Paying Living Expenses:    Food Insecurity:     Worried About Running Out of Food in the Last Year:     Ran Out of Food in the Last Year:    Transportation Needs:     Lack of Transportation (Medical):  Lack of Transportation (Non-Medical):    Physical Activity:     Days of Exercise per Week:     Minutes of Exercise per Session:    Stress:     Feeling of Stress :    Social Connections:     Frequency of Communication with Friends and Family:     Frequency of Social Gatherings with Friends and Family:     Attends [employer] Services:     Active Member of Clubs or Organizations:     Attends Club or Organization Meetings:     Marital Status:    Intimate Partner Violence:     Fear of Current or Ex-Partner:     Emotionally Abused:     Physically Abused:     Sexually Abused: ALLERGIES: Passion fruit, Peas, and Shellfish containing products    Review of Systems   Constitutional: Negative for fever. HENT: Negative for congestion. Respiratory: Positive for cough. Negative for shortness of breath. Cardiovascular: Positive for leg swelling. Negative for chest pain. Gastrointestinal: Negative for abdominal pain and vomiting. Genitourinary: Negative for dysuria. Musculoskeletal: Negative for back pain and neck pain. Neurological: Negative for headaches. All other systems reviewed and are negative. Vitals:    06/09/21 2028   BP: (!) 164/90   Pulse: 86   Resp: 18   Temp: 98.1 °F (36.7 °C)   SpO2: 97%   Weight: (!) 186 kg (410 lb)   Height: 5' 10\" (1.778 m)            Physical Exam  Vitals and nursing note reviewed. Constitutional:       General: She is not in acute distress. Appearance: Normal appearance. She is not toxic-appearing. HENT:      Head: Normocephalic and atraumatic. Nose: Nose normal.      Mouth/Throat:      Mouth: Mucous membranes are moist.      Pharynx: Oropharynx is clear.    Eyes:      Conjunctiva/sclera: Conjunctivae normal. Pupils: Pupils are equal, round, and reactive to light. Cardiovascular:      Rate and Rhythm: Normal rate and regular rhythm. Pulmonary:      Effort: Pulmonary effort is normal.      Breath sounds: Normal breath sounds. Abdominal:      General: There is no distension. Palpations: Abdomen is soft. Tenderness: There is no abdominal tenderness. Musculoskeletal:      Cervical back: Normal range of motion and neck supple. Comments: Swelling to both lower extremities right greater than the left. Skin:     General: Skin is warm and dry. Comments: Faint erythema to lower aspect of both shins. No lesions. Neurological:      Mental Status: She is alert and oriented to person, place, and time. Psychiatric:         Mood and Affect: Mood normal.         Behavior: Behavior normal.          MDM  Number of Diagnoses or Management Options  Cellulitis of lower extremity, unspecified laterality  Peripheral edema  Diagnosis management comments: Blood work is unremarkable. Ultrasound shows no DVT in either lower extremity. There may be a component of cellulitis considering the area of erythema. Will treat with Keflex. Patient is comfortable and appears safe for discharge home. Advised to elevate and use compression hose.        Amount and/or Complexity of Data Reviewed  Clinical lab tests: ordered and reviewed  Tests in the radiology section of CPT®: ordered and reviewed    Risk of Complications, Morbidity, and/or Mortality  Presenting problems: moderate  Diagnostic procedures: moderate  Management options: moderate           Procedures

## 2021-06-10 NOTE — ED NOTES
I have reviewed discharge instructions with the patient and parent. The patient and parent verbalized understanding. Patient left ED via Discharge Method: ambulatory to Home with mother    Opportunity for questions and clarification provided. Patient given 1 scripts. To continue your aftercare when you leave the hospital, you may receive an automated call from our care team to check in on how you are doing.  This is a free service and part of our promise to provide the best care and service to meet your aftercare needs. \" If you have questions, or wish to unsubscribe from this service please call [phone].  Thank you for Choosing our [facility].

## 2022-03-18 PROBLEM — J45.20 MILD INTERMITTENT ASTHMA WITHOUT COMPLICATION: Status: ACTIVE | Noted: 2021-02-25

## 2022-03-19 PROBLEM — E66.01 OBESITY, MORBID (HCC): Status: ACTIVE | Noted: 2018-10-31

## 2022-03-19 PROBLEM — G47.33 OSA (OBSTRUCTIVE SLEEP APNEA): Status: ACTIVE | Noted: 2019-04-15

## 2022-03-19 PROBLEM — Z86.718 HISTORY OF DVT (DEEP VEIN THROMBOSIS): Status: ACTIVE | Noted: 2021-02-18

## 2022-03-19 PROBLEM — G43.909 MIGRAINE: Status: ACTIVE | Noted: 2019-05-20

## 2022-03-19 PROBLEM — Z86.39 HISTORY OF HYPOTHYROIDISM: Status: ACTIVE | Noted: 2019-04-15

## 2022-03-20 PROBLEM — Z86.711 HISTORY OF PULMONARY EMBOLUS (PE): Status: ACTIVE | Noted: 2021-02-18

## 2022-07-05 ENCOUNTER — HOSPITAL ENCOUNTER (OUTPATIENT)
Dept: CT IMAGING | Age: 27
Discharge: HOME | End: 2022-07-08

## 2022-07-05 ENCOUNTER — TELEPHONE (OUTPATIENT)
Dept: FAMILY MEDICINE CLINIC | Facility: CLINIC | Age: 27
End: 2022-07-05

## 2022-07-05 ENCOUNTER — OFFICE VISIT (OUTPATIENT)
Dept: FAMILY MEDICINE CLINIC | Facility: CLINIC | Age: 27
End: 2022-07-05
Payer: COMMERCIAL

## 2022-07-05 VITALS
RESPIRATION RATE: 18 BRPM | HEIGHT: 70 IN | WEIGHT: 293 LBS | OXYGEN SATURATION: 97 % | BODY MASS INDEX: 41.95 KG/M2 | HEART RATE: 89 BPM | SYSTOLIC BLOOD PRESSURE: 120 MMHG | DIASTOLIC BLOOD PRESSURE: 66 MMHG

## 2022-07-05 DIAGNOSIS — R31.29 MICROSCOPIC HEMATURIA: ICD-10-CM

## 2022-07-05 DIAGNOSIS — M54.50 ACUTE BILATERAL LOW BACK PAIN WITHOUT SCIATICA: ICD-10-CM

## 2022-07-05 DIAGNOSIS — R35.0 URINE FREQUENCY: ICD-10-CM

## 2022-07-05 DIAGNOSIS — M54.50 ACUTE BILATERAL LOW BACK PAIN WITHOUT SCIATICA: Primary | ICD-10-CM

## 2022-07-05 LAB
BILIRUBIN, URINE, POC: NEGATIVE
BLOOD URINE, POC: NORMAL
GLUCOSE URINE, POC: NEGATIVE
KETONES, URINE, POC: NEGATIVE
LEUKOCYTE ESTERASE, URINE, POC: NEGATIVE
NITRITE, URINE, POC: NEGATIVE
PH, URINE, POC: 6.5 (ref 4.6–8)
PROTEIN,URINE, POC: NEGATIVE
SPECIFIC GRAVITY, URINE, POC: 1.02 (ref 1–1.03)
URINALYSIS CLARITY, POC: CLEAR
URINALYSIS COLOR, POC: YELLOW
UROBILINOGEN, POC: NORMAL

## 2022-07-05 PROCEDURE — 99214 OFFICE O/P EST MOD 30 MIN: CPT | Performed by: FAMILY MEDICINE

## 2022-07-05 PROCEDURE — 81003 URINALYSIS AUTO W/O SCOPE: CPT | Performed by: FAMILY MEDICINE

## 2022-07-05 PROCEDURE — 74176 CT ABD & PELVIS W/O CONTRAST: CPT

## 2022-07-05 RX ORDER — TIZANIDINE 4 MG/1
4 TABLET ORAL 2 TIMES DAILY PRN
Qty: 10 TABLET | Refills: 0 | Status: SHIPPED | OUTPATIENT
Start: 2022-07-05 | End: 2022-07-10

## 2022-07-05 RX ORDER — MELOXICAM 15 MG/1
15 TABLET ORAL DAILY PRN
Qty: 14 TABLET | Refills: 0 | Status: SHIPPED | OUTPATIENT
Start: 2022-07-05 | End: 2022-08-16 | Stop reason: ALTCHOICE

## 2022-07-05 ASSESSMENT — ENCOUNTER SYMPTOMS
BACK PAIN: 1
NAUSEA: 1
VOMITING: 1
ABDOMINAL PAIN: 1

## 2022-07-05 NOTE — PATIENT INSTRUCTIONS
Patient Education        Low Back Pain: Exercises  Introduction  Here are some examples of exercises for you to try. The exercises may be suggested for a condition or for rehabilitation. Start each exercise slowly. Ease off the exercises if you start to have pain. You will be told when to start these exercises and which ones will work bestfor you. How to do the exercises  Press-up    1. Lie on your stomach, supporting your body with your forearms. 2. Press your elbows down into the floor to raise your upper back. As you do this, relax your stomach muscles and allow your back to arch without using your back muscles. As your press up, do not let your hips or pelvis come off the floor. 3. Hold for 15 to 30 seconds, then relax. 4. Repeat 2 to 4 times. Alternate arm and leg (bird dog) exercise    Do this exercise slowly. Try to keep your body straight at all times, and donot let one hip drop lower than the other. 1. Start on the floor, on your hands and knees. 2. Tighten your belly muscles. 3. Raise one leg off the floor, and hold it straight out behind you. Be careful not to let your hip drop down, because that will twist your trunk. 4. Hold for about 6 seconds, then lower your leg and switch to the other leg. 5. Repeat 8 to 12 times on each leg. 6. Over time, work up to holding for 10 to 30 seconds each time. 7. If you feel stable and secure with your leg raised, try raising the opposite arm straight out in front of you at the same time. Knee-to-chest exercise    1. Lie on your back with your knees bent and your feet flat on the floor. 2. Bring one knee to your chest, keeping the other foot flat on the floor (or keeping the other leg straight, whichever feels better on your lower back). 3. Keep your lower back pressed to the floor. Hold for at least 15 to 30 seconds. 4. Relax, and lower the knee to the starting position. 5. Repeat with the other leg. Repeat 2 to 4 times with each leg.   6. To get more stretch, put your other leg flat on the floor while pulling your knee to your chest.  Curl-ups    1. Lie on the floor on your back with your knees bent at a 90-degree angle. Your feet should be flat on the floor, about 12 inches from your buttocks. 2. Cross your arms over your chest. If this bothers your neck, try putting your hands behind your neck (not your head), with your elbows spread apart. 3. Slowly tighten your belly muscles and raise your shoulder blades off the floor. 4. Keep your head in line with your body, and do not press your chin to your chest.  5. Hold this position for 1 or 2 seconds, then slowly lower yourself back down to the floor. 6. Repeat 8 to 12 times. Pelvic tilt exercise    1. Lie on your back with your knees bent. 2. \"Brace\" your stomach. This means to tighten your muscles by pulling in and imagining your belly button moving toward your spine. You should feel like your back is pressing to the floor and your hips and pelvis are rocking back. 3. Hold for about 6 seconds while you breathe smoothly. 4. Repeat 8 to 12 times. Heel dig bridging    1. Lie on your back with both knees bent and your ankles bent so that only your heels are digging into the floor. Your knees should be bent about 90 degrees. 2. Then push your heels into the floor, squeeze your buttocks, and lift your hips off the floor until your shoulders, hips, and knees are all in a straight line. 3. Hold for about 6 seconds as you continue to breathe normally, and then slowly lower your hips back down to the floor and rest for up to 10 seconds. 4. Do 8 to 12 repetitions. Hamstring stretch in doorway    1. Lie on your back in a doorway, with one leg through the open door. 2. Slide your leg up the wall to straighten your knee. You should feel a gentle stretch down the back of your leg. 3. Hold the stretch for at least 15 to 30 seconds. Do not arch your back, point your toes, or bend either knee.  Keep one heel touching the floor and the other heel touching the wall. 4. Repeat with your other leg. 5. Do 2 to 4 times for each leg. Hip flexor stretch    1. Kneel on the floor with one knee bent and one leg behind you. Place your forward knee over your foot. Keep your other knee touching the floor. 2. Slowly push your hips forward until you feel a stretch in the upper thigh of your rear leg. 3. Hold the stretch for at least 15 to 30 seconds. Repeat with your other leg. 4. Do 2 to 4 times on each side. Wall sit    1. Stand with your back 10 to 12 inches away from a wall. 2. Lean into the wall until your back is flat against it. 3. Slowly slide down until your knees are slightly bent, pressing your lower back into the wall. 4. Hold for about 6 seconds, then slide back up the wall. 5. Repeat 8 to 12 times. Follow-up care is a key part of your treatment and safety. Be sure to make and go to all appointments, and call your doctor if you are having problems. It's also a good idea to know your test results and keep alist of the medicines you take. Where can you learn more? Go to https://[email or web address]. org and sign in to your [employer] account. Enter O899 in the [facility] box to learn more about \"Low Back Pain: Exercises. \"     If you do not have an account, please click on the \"Sign Up Now\" link. Current as of: March 9, 2022               Content Version: 13.3  © 2006-2022 [employer]. Care instructions adapted under license by [facility] ([facility]). If you have questions about a medical condition or this instruction, always ask your healthcare professional. [place] [address] any warranty or liability for your use of this information.

## 2022-07-05 NOTE — PROGRESS NOTES
occasional urinary dribbling for about a week. She had some nausea and vomiting but this has improved for the last 2 days. She went to the urgent care last week she was prescribed 7-day course of Bactrim. She completed this yesterday. She has not had any fevers or chills but she did feel little warm yesterday. She has been good about drinking water. She does not have personal history of kidney stones but her mom and grandma both have history of kidney stones. She did note some blood in the toilet water and the toilet paper. She is on the Depo-Provera shot so her periods are regular she was not sure if this was menstrual or urine that contain the blood. She is having some right and left low back pain. But the right side is worse than the left. She is very uncomfortable. She is having hard time getting comfortable. She is not concerned for STIs. She had screening 2 months ago she has not had a new partner. Review of Systems:  Review of Systems   Constitutional: Negative for chills and fever. Gastrointestinal: Positive for abdominal pain, nausea and vomiting. Genitourinary: Positive for frequency and hematuria. Negative for pelvic pain. Musculoskeletal: Positive for back pain.          History:  Past Medical History:   Diagnosis Date    Asthma     DVT (deep venous thrombosis) ([facility] [address] 02/18/2021    Hypothyroid     Migraine     Moderate episode of recurrent major depressive disorder ([facility] [address] 4/15/2019    Pulmonary emboli ([facility]) 02/18/2021    Sleep apnea      does not use CPAP        Past Surgical History:   Procedure Laterality Date    HEENT      tonsils and adnoids       Current Outpatient Medications   Medication Sig Dispense Refill    tiZANidine (ZANAFLEX) 4 MG tablet Take 1 tablet by mouth 2 times daily as needed (back pain) 10 tablet 0    albuterol sulfate  (90 Base) MCG/ACT inhaler Inhale 1 puff into the lungs every 4 hours as needed      albuterol (PROVENTIL) (2.5 MG/3ML) 0.083% nebulizer solution Inhale into the lungs every 6 hours as needed      fluticasone-vilanterol (BREO ELLIPTA) 100-25 MCG/INH AEPB inhaler Inhale 1 puff into the lungs daily      frovatriptan succinate (FROVA) 2.5 MG TABS Take 1 tab PO at onset of migraine and 1 tab PO 2 hours later if needed      medroxyPROGESTERone (DEPO-PROVERA) 150 MG/ML injection Inject 150 mg into the muscle once       No current facility-administered medications for this visit. Immunization History   Administered Date(s) Administered    Influenza Virus Vaccine 02/08/2016    Influenza, Quadv, IM, PF (6 mo and older Fluzone, Flulaval, Fluarix, and 3 yrs and older Afluria) 02/08/2016             Vitals:    Vitals:    07/05/22 1104   BP: 120/66   Site: Left Upper Arm   Position: Sitting   Pulse: 89   Resp: 18   SpO2: 97%   Weight: (!) 418 lb 6.4 oz (189.8 kg)   Height: 5' 10\" (1.778 m)          Physical Exam:  Physical Exam  Vitals reviewed. Constitutional:       Appearance: Normal appearance. HENT:      Head: Normocephalic and atraumatic. Pulmonary:      Effort: Pulmonary effort is normal. No respiratory distress. Abdominal:      General: There is no distension. Tenderness: There is abdominal tenderness in the suprapubic area. There is no right CVA tenderness, left CVA tenderness or rebound. Musculoskeletal:      Comments: Tenderness to palpation of the right paraspinal muscles of the lumbar region and the right PSIS   Neurological:      Mental Status: She is alert. Psychiatric:         Mood and Affect: Mood normal.         Behavior: Behavior normal.             Nati Ortiz DO    This note was generated using Dragon voice recognition software.   There may be medical errors due to computer generated translation

## 2022-07-07 LAB
BACTERIA SPEC CULT: NORMAL
BACTERIA SPEC CULT: NORMAL
SERVICE CMNT-IMP: NORMAL

## 2022-07-08 ENCOUNTER — TELEPHONE (OUTPATIENT)
Dept: FAMILY MEDICINE CLINIC | Facility: CLINIC | Age: 27
End: 2022-07-08

## 2022-07-08 NOTE — TELEPHONE ENCOUNTER
I prescribed her meloxicam so she cannot take Advil or ibuprofen. She can take Tylenol. She can also use heat and topical rubs. She can try half tab of tizanidine. But all muscle relaxants tend to make people sleepy.

## 2022-07-15 ENCOUNTER — TELEPHONE (OUTPATIENT)
Dept: FAMILY MEDICINE CLINIC | Facility: CLINIC | Age: 27
End: 2022-07-15

## 2022-07-15 ENCOUNTER — HOSPITAL ENCOUNTER (EMERGENCY)
Age: 27
Discharge: HOME | End: 2022-07-15
Attending: EMERGENCY MEDICINE
Payer: COMMERCIAL

## 2022-07-15 ENCOUNTER — APPOINTMENT (OUTPATIENT)
Dept: GENERAL RADIOLOGY | Age: 27
End: 2022-07-15
Payer: COMMERCIAL

## 2022-07-15 ENCOUNTER — APPOINTMENT (OUTPATIENT)
Dept: CT IMAGING | Age: 27
End: 2022-07-15
Payer: COMMERCIAL

## 2022-07-15 VITALS
BODY MASS INDEX: 41.02 KG/M2 | WEIGHT: 293 LBS | HEIGHT: 71 IN | TEMPERATURE: 98.3 F | RESPIRATION RATE: 16 BRPM | OXYGEN SATURATION: 100 % | HEART RATE: 74 BPM | SYSTOLIC BLOOD PRESSURE: 167 MMHG | DIASTOLIC BLOOD PRESSURE: 93 MMHG

## 2022-07-15 DIAGNOSIS — S39.012A STRAIN OF LUMBAR REGION, INITIAL ENCOUNTER: Primary | ICD-10-CM

## 2022-07-15 LAB
BILIRUB UR QL: NEGATIVE
GLUCOSE UR QL STRIP.AUTO: NEGATIVE MG/DL
HCG UR QL: NEGATIVE
KETONES UR-MCNC: NEGATIVE MG/DL
LEUKOCYTE ESTERASE UR QL STRIP: NEGATIVE
NITRITE UR QL: NEGATIVE
PH UR: 6 [PH] (ref 5–9)
PROT UR QL: 30 MG/DL
RBC # UR STRIP: ABNORMAL /UL
SERVICE CMNT-IMP: ABNORMAL
SP GR UR: >1.03 (ref 1–1.02)
UROBILINOGEN UR QL: 0.2 EU/DL (ref 0.2–1)

## 2022-07-15 PROCEDURE — 6360000002 HC RX W HCPCS: Performed by: PHYSICIAN ASSISTANT

## 2022-07-15 PROCEDURE — 99284 EMERGENCY DEPT VISIT MOD MDM: CPT

## 2022-07-15 PROCEDURE — 81003 URINALYSIS AUTO W/O SCOPE: CPT

## 2022-07-15 PROCEDURE — 6370000000 HC RX 637 (ALT 250 FOR IP): Performed by: PHYSICIAN ASSISTANT

## 2022-07-15 PROCEDURE — 72131 CT LUMBAR SPINE W/O DYE: CPT

## 2022-07-15 PROCEDURE — 73502 X-RAY EXAM HIP UNI 2-3 VIEWS: CPT

## 2022-07-15 PROCEDURE — 81025 URINE PREGNANCY TEST: CPT

## 2022-07-15 RX ORDER — IBUPROFEN 600 MG/1
600 TABLET ORAL
Status: COMPLETED | OUTPATIENT
Start: 2022-07-15 | End: 2022-07-15

## 2022-07-15 RX ORDER — ONDANSETRON 4 MG/1
4 TABLET, ORALLY DISINTEGRATING ORAL
Status: COMPLETED | OUTPATIENT
Start: 2022-07-15 | End: 2022-07-15

## 2022-07-15 RX ORDER — DIAZEPAM 5 MG/1
5 TABLET ORAL EVERY 6 HOURS PRN
Status: DISCONTINUED | OUTPATIENT
Start: 2022-07-15 | End: 2022-07-16 | Stop reason: HOSPADM

## 2022-07-15 RX ORDER — OXYCODONE AND ACETAMINOPHEN 7.5; 325 MG/1; MG/1
1 TABLET ORAL EVERY 4 HOURS PRN
Status: DISCONTINUED | OUTPATIENT
Start: 2022-07-15 | End: 2022-07-16 | Stop reason: HOSPADM

## 2022-07-15 RX ORDER — DEXAMETHASONE SODIUM PHOSPHATE 10 MG/ML
10 INJECTION INTRAMUSCULAR; INTRAVENOUS
Status: COMPLETED | OUTPATIENT
Start: 2022-07-15 | End: 2022-07-15

## 2022-07-15 RX ORDER — METHYLPREDNISOLONE 4 MG/1
4 TABLET ORAL SEE ADMIN INSTRUCTIONS
Qty: 1 KIT | Refills: 0 | Status: SHIPPED | OUTPATIENT
Start: 2022-07-15 | End: 2022-07-21

## 2022-07-15 RX ORDER — METHOCARBAMOL 500 MG/1
500 TABLET, FILM COATED ORAL 4 TIMES DAILY PRN
Qty: 40 TABLET | Refills: 0 | Status: SHIPPED | OUTPATIENT
Start: 2022-07-15 | End: 2022-07-25

## 2022-07-15 RX ORDER — LIDOCAINE 50 MG/G
1 PATCH TOPICAL DAILY
Qty: 30 PATCH | Refills: 0 | Status: SHIPPED | OUTPATIENT
Start: 2022-07-15 | End: 2022-08-14

## 2022-07-15 RX ADMIN — DIAZEPAM 5 MG: 5 TABLET ORAL at 21:19

## 2022-07-15 RX ADMIN — IBUPROFEN 600 MG: 600 TABLET ORAL at 21:19

## 2022-07-15 RX ADMIN — OXYCODONE AND ACETAMINOPHEN 1 TABLET: 7.5; 325 TABLET ORAL at 21:19

## 2022-07-15 RX ADMIN — ONDANSETRON 4 MG: 4 TABLET, ORALLY DISINTEGRATING ORAL at 21:19

## 2022-07-15 RX ADMIN — DEXAMETHASONE SODIUM PHOSPHATE 10 MG: 10 INJECTION INTRAMUSCULAR; INTRAVENOUS at 21:20

## 2022-07-15 ASSESSMENT — ENCOUNTER SYMPTOMS
BACK PAIN: 1
ABDOMINAL PAIN: 0
EYES NEGATIVE: 1
RESPIRATORY NEGATIVE: 1
BOWEL INCONTINENCE: 0
GASTROINTESTINAL NEGATIVE: 1
ALLERGIC/IMMUNOLOGIC NEGATIVE: 1
ABDOMINAL SWELLING: 0

## 2022-07-15 ASSESSMENT — PAIN - FUNCTIONAL ASSESSMENT: PAIN_FUNCTIONAL_ASSESSMENT: 0-10

## 2022-07-15 ASSESSMENT — PAIN SCALES - GENERAL: PAINLEVEL_OUTOF10: 9

## 2022-07-15 NOTE — TELEPHONE ENCOUNTER
Pt called and stated she has been taking the Mobic and Tizanidine. Pt stated she is not getting any relief from taking the medications. Pt is requesting an appointment before Friday 6/22.   Scheduled pt for Friday, 6/22

## 2022-07-15 NOTE — Clinical Note
Lesley Mueller was seen and treated in our emergency department on 7/15/2022. She may return to work on 07/18/2022. If you have any questions or concerns, please don't hesitate to call.       Cameron Ruelas MD

## 2022-07-15 NOTE — Clinical Note
Gelacio Childs was seen and treated in our emergency department on 7/15/2022. She may return to work on 07/18/2022. If you have any questions or concerns, please don't hesitate to call.       Pancho La MD

## 2022-07-16 NOTE — ED NOTES
Patient has abnormal menses and is on Depo. Blood in urine is most likely starting of menses. CT negative for stones in the last week and none seen today. No bacteria or WBC in urine. Patient with reproducible back pain on exam and no urgent or emergent signs or symptoms to indicate an emergent need for further evaluation here in the ED.      MARC Cody  07/16/22 4607

## 2022-07-16 NOTE — ED PROVIDER NOTES
[facility] Emergency Department Provider Note                   PCP:                Leonard Arriola DO               Age: 24 y.o. Sex: female       ICD-10-CM    1. Strain of lumbar region, initial encounter  S39.012A           DISPOSITION Decision To Discharge 07/15/2022 09:57:40 PM        MDM     Amount and/or Complexity of Data Reviewed  Clinical lab tests: ordered and reviewed  Tests in the radiology section of CPT®: ordered and reviewed    Risk of Complications, Morbidity, and/or Mortality  Presenting problems: moderate  Diagnostic procedures: moderate  Management options: moderate  General comments: Patient is starting to feel better from the medication. She was outside playing with another patient. She was ambulating and without difficulty. I will treat patient for lumbar strain and have her use warm, moist heat to area, massage, gentle range of motion and stretching to area. Use the medication as directed, ED if worse. I will refer to a chiropractor and Ortho for follow up if needed. Also, follow up with PCP for recheck. She should stop the tidazidine and meloxicam and start the medications that I have written her. She is stable for discharge and ambulatory out of the ED without difficulty at this time. Patient Progress  Patient progress: improved       Orders Placed This Encounter   Procedures    CT LUMBAR SPINE WO CONTRAST    XR HIP RIGHT (2-3 VIEWS)    Urine dipstick    POC PREGNANCY UR-QUAL    POCT Urinalysis no Micro    POC Pregnancy Urine Qual        Shelly Pham is a 24 y.o. female who presents to the Emergency Department with chief complaint of    Chief Complaint   Patient presents with    Back Pain      Patient is here with midline to right-sided back pain that is been there for about 2 weeks. She has not injured it. It is gotten progressively worse. She saw her primary care physician who did a CT for a kidney stone. They did not see a kidney stone.   They thought she may have had a urinary tract infection but she is not getting any better. She is taking a muscle relaxer and an anti-inflammatory pain medicine. It hurts to change positions. No fever. No chest pain, shortness of breath, upper abdominal pain, dizziness, weakness, dyspnea exertion, orthopnea, swelling/tingling or weakness to their arms or legs, trouble with urination or bowel movements or other new symptoms. They did ambulate to the room without difficulty and is well hydrated. A family member drove her. The history is provided by the patient. Back Pain  Location:  Lumbar spine  Quality:  Aching  Radiates to:  R posterior upper leg  Pain severity:  Severe  Duration:  2 weeks  Timing:  Constant  Progression:  Worsening  Chronicity:  New  Context: not emotional stress, not falling, not jumping from heights, not lifting heavy objects, not MCA, not MVA, not occupational injury, not pedestrian accident, not physical stress, not recent illness, not recent injury and not twisting    Relieved by:  Nothing  Worsened by: Movement  Ineffective treatments:  Muscle relaxants and NSAIDs  Associated symptoms: no abdominal pain, no abdominal swelling, no bladder incontinence, no bowel incontinence, no chest pain, no dysuria, no fever, no headaches, no leg pain, no numbness, no paresthesias, no pelvic pain, no perianal numbness, no tingling, no weakness and no weight loss    Risk factors: no hx of cancer, no hx of osteoporosis, no lack of exercise, no menopause, not obese, not pregnant, no recent surgery, no steroid use and no vascular disease        Review of Systems   Constitutional: Negative. Negative for fever and weight loss. HENT: Negative. Eyes: Negative. Respiratory: Negative. Cardiovascular: Negative. Negative for chest pain. Gastrointestinal: Negative. Negative for abdominal pain and bowel incontinence. Endocrine: Negative. Genitourinary: Negative.   Negative for bladder incontinence, dysuria and pelvic pain. Musculoskeletal:  Positive for back pain. Skin: Negative. Allergic/Immunologic: Negative. Neurological: Negative. Negative for tingling, weakness, numbness, headaches and paresthesias. Hematological: Negative. Psychiatric/Behavioral: Negative. All other systems reviewed and are negative. Past Medical History:   Diagnosis Date    Asthma     DV[facility] ([facility] 02/18/2021    Hypothyroid     Migraine     Moderate episode of recurrent major depressive disorder ([facility] 4/15/2019    Pulmonary emboli ([facility] 02/18/2021    Sleep apnea      does not use CPAP         Past Surgical History:   Procedure Laterality Date    HEENT      tonsils and adnoids        Family History   Problem Relation Age of Onset    No Known Problems Mother     Hypertension Father         Social Connections: Not on file        Allergies   Allergen Reactions    Other Anaphylaxis and Swelling     Passion Fruit      Pea Hives     Other reaction(s): Hives/Swelling-A    Shellfish Allergy Hives and Swelling     Other reaction(s): Hives/Swelling-A          Vitals signs and nursing note reviewed. Patient Vitals for the past 4 hrs:   Temp Pulse Resp BP SpO2   07/15/22 2048 98.3 °F (36.8 °C) 74 16 (!) 167/93 100 %          Physical Exam  Vitals and nursing note reviewed. Constitutional:       Appearance: Normal appearance. She is obese. She is not ill-appearing. HENT:      Head: Normocephalic and atraumatic. Right Ear: External ear normal.      Left Ear: External ear normal.      Nose: Nose normal.      Mouth/Throat:      Mouth: Mucous membranes are moist.   Eyes:      Extraocular Movements: Extraocular movements intact. Conjunctiva/sclera: Conjunctivae normal.      Pupils: Pupils are equal, round, and reactive to light. Cardiovascular:      Rate and Rhythm: Normal rate. Pulses: Normal pulses. Pulmonary:      Effort: Pulmonary effort is normal.   Abdominal:      General: Abdomen is flat. Palpations: Abdomen is soft. Musculoskeletal:         General: Tenderness present. No swelling, deformity or signs of injury. Normal range of motion. Cervical back: Normal range of motion. Lumbar back: Spasms and tenderness present. Back:       Right lower leg: No edema. Left lower leg: No edema. Comments: Area of pain, there is spasm and tenderness to palpation. She does have full range of motion and is distally neurovascularly intact. Skin:     General: Skin is warm. Capillary Refill: Capillary refill takes less than 2 seconds. Neurological:      General: No focal deficit present. Mental Status: She is alert and oriented to person, place, and time. Mental status is at baseline. GCS: GCS eye subscore is 4. GCS verbal subscore is 5. GCS motor subscore is 6. Cranial Nerves: Cranial nerves are intact. Sensory: Sensation is intact. Motor: Motor function is intact. Coordination: Coordination is intact. Psychiatric:         Mood and Affect: Mood normal.         Behavior: Behavior normal.         Thought Content: Thought content normal.         Judgment: Judgment normal.        Procedures      Labs Reviewed   POCT URINALYSIS DIPSTICK - Abnormal; Notable for the following components:       Result Value    Specific Gravity, Urine, POC >1.030 (*)     Protein, Urine, POC 30 (*)     Blood, UA POC SMALL (*)     All other components within normal limits   POC PREGNANCY UR-QUAL        CT LUMBAR SPINE WO CONTRAST   Final Result   Unremarkable CT lumbar spine without contrast.      XR HIP RIGHT (2-3 VIEWS)   Final Result   Unremarkable right hip series. Voice dictation software was used during the making of this note. This software is not perfect and grammatical and other typographical errors may be present. This note has not been completely proofread for errors.      MARC Islas  07/15/22 8839       [facility] [place]  07/15/22 [address] Abiodun Knight , [place]  07/15/22 9699

## 2022-07-16 NOTE — ED TRIAGE NOTES
Pt state she has been having back pain 2 weeks seen multiple doctors and she is not getting any relief from the pain had ct looking for kidney stones and that was negative     Pt states she just cant take the pain

## 2022-07-18 NOTE — TELEPHONE ENCOUNTER
Pt went to the ER and they prescribed muscle relaxers for her. She declined appt today and will keep her appointment on Friday to follow up.

## 2022-08-15 ENCOUNTER — TELEPHONE (OUTPATIENT)
Dept: FAMILY MEDICINE CLINIC | Facility: CLINIC | Age: 27
End: 2022-08-15

## 2022-08-15 NOTE — TELEPHONE ENCOUNTER
[facility] called advised patient needed an urgent appointment due to wheezing and coughing before they could transfer patient had to hang up. I tried to call back no answer left vm to call back if needing an appointment.

## 2022-08-15 NOTE — TELEPHONE ENCOUNTER
Pt symptoms consist of cough, congestion, wheezing, and stuffy nose. Not tested for covid and no other symptom. Requesting appt.

## 2022-08-16 ENCOUNTER — OFFICE VISIT (OUTPATIENT)
Dept: FAMILY MEDICINE CLINIC | Facility: CLINIC | Age: 27
End: 2022-08-16
Payer: COMMERCIAL

## 2022-08-16 ENCOUNTER — TELEPHONE (OUTPATIENT)
Dept: FAMILY MEDICINE CLINIC | Facility: CLINIC | Age: 27
End: 2022-08-16

## 2022-08-16 VITALS
HEIGHT: 71 IN | DIASTOLIC BLOOD PRESSURE: 70 MMHG | RESPIRATION RATE: 18 BRPM | OXYGEN SATURATION: 97 % | HEART RATE: 95 BPM | TEMPERATURE: 98.4 F | SYSTOLIC BLOOD PRESSURE: 120 MMHG | BODY MASS INDEX: 41.02 KG/M2 | WEIGHT: 293 LBS

## 2022-08-16 DIAGNOSIS — E66.01 OBESITY, MORBID (HCC): ICD-10-CM

## 2022-08-16 DIAGNOSIS — G47.33 OSA (OBSTRUCTIVE SLEEP APNEA): ICD-10-CM

## 2022-08-16 DIAGNOSIS — R06.2 WHEEZING: ICD-10-CM

## 2022-08-16 DIAGNOSIS — R05.9 COUGH: Primary | ICD-10-CM

## 2022-08-16 DIAGNOSIS — Z13.220 LIPID SCREENING: ICD-10-CM

## 2022-08-16 DIAGNOSIS — E66.01 OBESITY, MORBID (HCC): Primary | ICD-10-CM

## 2022-08-16 DIAGNOSIS — R05.9 COUGH: ICD-10-CM

## 2022-08-16 DIAGNOSIS — Z13.1 DIABETES MELLITUS SCREENING: ICD-10-CM

## 2022-08-16 DIAGNOSIS — J45.21 MILD INTERMITTENT ASTHMA WITH (ACUTE) EXACERBATION: ICD-10-CM

## 2022-08-16 LAB
EXP DATE SOLUTION: NORMAL
EXP DATE SWAB: NORMAL
LOT NUMBER SOLUTION: NORMAL
LOT NUMBER SWAB: NORMAL
SARS-COV-2 RNA, POC: NEGATIVE
SARS-COV-2: NORMAL

## 2022-08-16 PROCEDURE — 87635 SARS-COV-2 COVID-19 AMP PRB: CPT | Performed by: FAMILY MEDICINE

## 2022-08-16 PROCEDURE — 99214 OFFICE O/P EST MOD 30 MIN: CPT | Performed by: FAMILY MEDICINE

## 2022-08-16 RX ORDER — SEMAGLUTIDE 0.25 MG/.5ML
0.25 INJECTION, SOLUTION SUBCUTANEOUS
Qty: 2 ML | Refills: 0 | Status: SHIPPED | OUTPATIENT
Start: 2022-08-16

## 2022-08-16 RX ORDER — ALBUTEROL SULFATE 90 UG/1
2 AEROSOL, METERED RESPIRATORY (INHALATION) EVERY 4 HOURS PRN
Qty: 2 EACH | Refills: 3 | Status: SHIPPED | OUTPATIENT
Start: 2022-08-16

## 2022-08-16 RX ORDER — PREDNISONE 20 MG/1
20 TABLET ORAL DAILY
Qty: 5 TABLET | Refills: 0 | Status: SHIPPED | OUTPATIENT
Start: 2022-08-16 | End: 2022-08-21

## 2022-08-16 RX ORDER — LORATADINE 10 MG/1
10 TABLET ORAL DAILY PRN
Qty: 30 TABLET | Refills: 5 | Status: SHIPPED | OUTPATIENT
Start: 2022-08-16

## 2022-08-16 ASSESSMENT — ENCOUNTER SYMPTOMS
COUGH: 1
ABDOMINAL PAIN: 0
SORE THROAT: 0
RHINORRHEA: 0
DIARRHEA: 0
SINUS PRESSURE: 0
WHEEZING: 1
VOMITING: 0
SHORTNESS OF BREATH: 0
CHEST TIGHTNESS: 1
SINUS PAIN: 0
NAUSEA: 0

## 2022-08-16 NOTE — TELEPHONE ENCOUNTER
[facility] called and stated all doses of Wegovy except 2.4 mg are on backorder. [facility] stated they tried to run the 2.4 mg and this medication is not covered by patients insurance. [facility] stated the cost of the [facility]  is @ $1500. [facility] is requesting an alternative medication.

## 2022-08-16 NOTE — PROGRESS NOTES
[address] [facility],[address], DO  [address], [address] [facility] [address]  Ph No:  [phone]  Fax:  [phone]        Assessment/Plan:   Sarah Lord was seen today for cough and wheezing. Diagnoses and all orders for this visit:    Cough  -     AMB POC COVID-19 COV  -     COVID-19; Future    Wheezing  -     AMB POC COVID-19 COV    Obesity, morbid ([address]  Prescribing Wegovy. Bariatric surgery is not covered by her insurance. Advised patient of common side effects, black box warning. Advised patient dose will be increased every 4 weeks. Advised patient she may benefit from screening labs to assess sugar level. -     Semaglutide-Weight Management (WEGOVY) 0.25 MG/0.5ML SOAJ SC injection; Inject 0.25 mg into the skin every 7 days    Mild intermittent asthma with (acute) exacerbation  Acute asthma exacerbation. Overall suspect viral to allergic cause. Prescribing loratadine. COVID-19 negative in the office. Short course of prednisone. Patient to contact the office if she develops fever or chills that would warrant a chest x-ray. Continue albuterol as needed but advised nebulizer in the morning, after work and before bedtime if not more often. Continue albuterol as needed. Advised patient to continue the [place]. -     predniSONE (DELTASONE) 20 MG tablet; Take 1 tablet by mouth in the morning for 5 days. -     loratadine (CLARITIN) 10 MG tablet; Take 1 tablet by mouth daily as needed (allergies)  -     COVID-19; Future    LLOYD (obstructive sleep apnea)  Advised patient to restart her CPAP for overall health and help with her daytime fatigue.         Labs:  Results for orders placed or performed in visit on 08/16/22   AMB POC COVID-19 COV   Result Value Ref Range    SARS-COV-2 RNA, POC Negative     Lot number swab      EXP date swab      Lot number solution      EXP date solution               Maria R Miller is a 24 y.o. female who is seen for evaluation of   Chief Complaint Patient presents with    Cough     Onset 4 days    Wheezing       HPI:   4 days of cough, mostly dry but productive in the morning with green sputum. Green nasal discharge in the morning and clear in the afternoon. Started after she was at the vet with her animal.  She is allergic to cats and dogs and she was there for about an hour. She has been using Mucinex D. She has not had fever, chills. She is had a little bit of fatigue. No nausea, vomiting. No sore throat, headache, body aches. She does have daytime fatigue. She does have sleep apnea but not using CPAP due to it being uncomfortable. She has been using Breo for the last several days. But had not been using it prior. She is using her rescue inhaler about every 3 hours and albuterol in the evening. She has plenty of nebulizer material but will need a new albuterol inhaler. She would like to lose weight. She has simply lost 50 pounds on her own. She states she is too tired during the day to do exercise after work. Review of Systems:  Review of Systems   Constitutional:  Negative for appetite change and chills. HENT:  Positive for congestion. Negative for rhinorrhea, sinus pressure, sinus pain and sore throat. Respiratory:  Positive for cough, chest tightness and wheezing. Negative for shortness of breath. Cardiovascular:  Negative for chest pain. Gastrointestinal:  Negative for abdominal pain, diarrhea, nausea and vomiting. Musculoskeletal:  Negative for myalgias. Neurological:  Negative for headaches.        History:  Past Medical History:   Diagnosis Date    Asthma     DVT (deep venous thrombosis) ([facility] 02/18/2021    Hypothyroid     Migraine     Moderate episode of recurrent major depressive disorder ([address] 4/15/2019    [facility] 02/18/2021    Sleep apnea      does not use CPAP        Past Surgical History:   Procedure Laterality Date    HEENT      tonsils and adnoids       Current Outpatient Medications Medication Sig Dispense Refill    Semaglutide-Weight Management (WEGOVY) 0.25 MG/0.5ML SOAJ SC injection Inject 0.25 mg into the skin every 7 days 2 mL 0    predniSONE (DELTASONE) 20 MG tablet Take 1 tablet by mouth in the morning for 5 days. 5 tablet 0    loratadine (CLARITIN) 10 MG tablet Take 1 tablet by mouth daily as needed (allergies) 30 tablet 5    albuterol sulfate  (90 Base) MCG/ACT inhaler Inhale 1 puff into the lungs every 4 hours as needed      albuterol (PROVENTIL) (2.5 MG/3ML) 0.083% nebulizer solution Inhale into the lungs every 6 hours as needed      fluticasone-vilanterol (BREO ELLIPTA) 100-25 MCG/INH AEPB inhaler Inhale 1 puff into the lungs daily      frovatriptan succinate (FROVA) 2.5 MG TABS Take 1 tab PO at onset of migraine and 1 tab PO 2 hours later if needed      medroxyPROGESTERone (DEPO-PROVERA) 150 MG/ML injection Inject 150 mg into the muscle once       No current facility-administered medications for this visit. Immunization History   Administered Date(s) Administered    Influenza Virus Vaccine 02/08/2016    Influenza, Quadv, IM, PF (6 mo and older Fluzone, Flulaval, Fluarix, and 3 yrs and older Afluria) 02/08/2016             Vitals:    Vitals:    08/16/22 1534   BP: 120/70   Site: Left Lower Arm   Position: Sitting   Pulse: 95   Resp: 18   Temp: 98.4 °F (36.9 °C)   TempSrc: Oral   SpO2: 97%   Weight: (!) 398 lb (180.5 kg)  Comment: pt stated weight   Height: 5' 10.5\" (1.791 m)          Physical Exam:  Physical Exam  Vitals reviewed. Constitutional:       Appearance: Normal appearance. HENT:      Head: Normocephalic and atraumatic. Right Ear: Tympanic membrane, ear canal and external ear normal.      Left Ear: Tympanic membrane, ear canal and external ear normal.      Nose: Rhinorrhea present. Rhinorrhea is clear. Right Sinus: No maxillary sinus tenderness or frontal sinus tenderness.       Left Sinus: No maxillary sinus tenderness or frontal sinus tenderness. Mouth/Throat:      Mouth: Mucous membranes are moist.      Pharynx: No oropharyngeal exudate. Tonsils: No tonsillar exudate. Cardiovascular:      Rate and Rhythm: Normal rate and regular rhythm. Heart sounds: No murmur heard. Pulmonary:      Effort: Pulmonary effort is normal. No respiratory distress. Breath sounds: Wheezing (all lung fields) present. Lymphadenopathy:      Cervical: No cervical adenopathy. Skin:     General: Skin is warm. Findings: No rash. Neurological:      Mental Status: She is alert. Psychiatric:         Mood and Affect: Mood normal.         Behavior: Behavior normal.           Ulysses Chun DO    This note was generated using Dragon voice recognition software.   There may be medical errors due to computer generated translation

## 2022-08-17 LAB
SARS-COV-2 RNA RESP QL NAA+PROBE: NOT DETECTED
SOURCE: NORMAL

## 2022-08-30 ENCOUNTER — NURSE ONLY (OUTPATIENT)
Dept: FAMILY MEDICINE CLINIC | Facility: CLINIC | Age: 27
End: 2022-08-30

## 2022-08-30 DIAGNOSIS — Z13.1 DIABETES MELLITUS SCREENING: ICD-10-CM

## 2022-08-30 DIAGNOSIS — Z13.220 LIPID SCREENING: ICD-10-CM

## 2022-08-30 DIAGNOSIS — E66.01 OBESITY, MORBID (HCC): ICD-10-CM

## 2022-08-30 LAB
BASOPHILS # BLD: 0.1 K/UL (ref 0–0.2)
BASOPHILS NFR BLD: 1 % (ref 0–2)
DIFFERENTIAL METHOD BLD: ABNORMAL
EOSINOPHIL # BLD: 0.1 K/UL (ref 0–0.8)
EOSINOPHIL NFR BLD: 2 % (ref 0.5–7.8)
ERYTHROCYTE [DISTWIDTH] IN BLOOD BY AUTOMATED COUNT: 14.8 % (ref 11.9–14.6)
HCT VFR BLD AUTO: 40.6 % (ref 35.8–46.3)
HGB BLD-MCNC: 12.7 G/DL (ref 11.7–15.4)
IMM GRANULOCYTES # BLD AUTO: 0 K/UL (ref 0–0.5)
IMM GRANULOCYTES NFR BLD AUTO: 0 % (ref 0–5)
LYMPHOCYTES # BLD: 2.4 K/UL (ref 0.5–4.6)
LYMPHOCYTES NFR BLD: 29 % (ref 13–44)
MCH RBC QN AUTO: 27.3 PG (ref 26.1–32.9)
MCHC RBC AUTO-ENTMCNC: 31.3 G/DL (ref 31.4–35)
MCV RBC AUTO: 87.3 FL (ref 79.6–97.8)
MONOCYTES # BLD: 0.6 K/UL (ref 0.1–1.3)
MONOCYTES NFR BLD: 7 % (ref 4–12)
NEUTS SEG # BLD: 4.9 K/UL (ref 1.7–8.2)
NEUTS SEG NFR BLD: 61 % (ref 43–78)
NRBC # BLD: 0 K/UL (ref 0–0.2)
PLATELET # BLD AUTO: 249 K/UL (ref 150–450)
PMV BLD AUTO: 12.1 FL (ref 9.4–12.3)
RBC # BLD AUTO: 4.65 M/UL (ref 4.05–5.2)
WBC # BLD AUTO: 8 K/UL (ref 4.3–11.1)

## 2022-08-31 LAB
ALBUMIN SERPL-MCNC: 3.6 G/DL (ref 3.5–5)
ALBUMIN/GLOB SERPL: 1.1 {RATIO} (ref 1.2–3.5)
ALP SERPL-CCNC: 89 U/L (ref 50–136)
ALT SERPL-CCNC: 23 U/L (ref 12–65)
ANION GAP SERPL CALC-SCNC: 6 MMOL/L (ref 7–16)
AST SERPL-CCNC: 9 U/L (ref 15–37)
BILIRUB SERPL-MCNC: 0.2 MG/DL (ref 0.2–1.1)
BUN SERPL-MCNC: 12 MG/DL (ref 6–23)
CALCIUM SERPL-MCNC: 8.9 MG/DL (ref 8.3–10.4)
CHLORIDE SERPL-SCNC: 109 MMOL/L (ref 98–107)
CHOLEST SERPL-MCNC: 181 MG/DL
CO2 SERPL-SCNC: 24 MMOL/L (ref 21–32)
CREAT SERPL-MCNC: 0.7 MG/DL (ref 0.6–1)
EST. AVERAGE GLUCOSE BLD GHB EST-MCNC: 108 MG/DL
GLOBULIN SER CALC-MCNC: 3.3 G/DL (ref 2.3–3.5)
GLUCOSE SERPL-MCNC: 94 MG/DL (ref 65–100)
HBA1C MFR BLD: 5.4 % (ref 4.8–5.6)
HDLC SERPL-MCNC: 38 MG/DL (ref 40–60)
HDLC SERPL: 4.8 {RATIO}
LDLC SERPL CALC-MCNC: 120.2 MG/DL
POTASSIUM SERPL-SCNC: 4.4 MMOL/L (ref 3.5–5.1)
PROT SERPL-MCNC: 6.9 G/DL (ref 6.3–8.2)
SODIUM SERPL-SCNC: 139 MMOL/L (ref 136–145)
TRIGL SERPL-MCNC: 114 MG/DL (ref 35–150)
VLDLC SERPL CALC-MCNC: 22.8 MG/DL (ref 6–23)

## 2022-11-14 ENCOUNTER — PATIENT MESSAGE (OUTPATIENT)
Dept: FAMILY MEDICINE CLINIC | Facility: CLINIC | Age: 27
End: 2022-11-14

## 2022-11-14 DIAGNOSIS — E66.01 OBESITY, MORBID (HCC): Primary | ICD-10-CM

## 2022-11-14 RX ORDER — TIRZEPATIDE 2.5 MG/.5ML
2.5 INJECTION, SOLUTION SUBCUTANEOUS WEEKLY
Qty: 4 ADJUSTABLE DOSE PRE-FILLED PEN SYRINGE | Refills: 0 | Status: SHIPPED | OUTPATIENT
Start: 2022-11-14

## 2022-11-21 ENCOUNTER — TELEPHONE (OUTPATIENT)
Dept: FAMILY MEDICINE CLINIC | Facility: CLINIC | Age: 27
End: 2022-11-21

## 2022-11-21 NOTE — TELEPHONE ENCOUNTER
Completed prior authorization for [facility] through [facility]. This medication has been denied. Denial is due to that insurance plan does not cover weight loss medications. Pt notified of denial. Pt stated she also tried to use the discount coupon but cost was still @ $500.

## 2023-01-16 ENCOUNTER — TELEPHONE (OUTPATIENT)
Dept: FAMILY MEDICINE CLINIC | Facility: CLINIC | Age: 28
End: 2023-01-16

## 2023-01-16 NOTE — TELEPHONE ENCOUNTER
Patient called advised as of Friday night has had cough, congestion, sore throat, fever on Saturday. No covid testing and no flu shot.  I did advise provider out of office today did advise of UC, Patient wanting to know if anyway to be seen tomorrow,

## 2023-01-17 ENCOUNTER — TELEPHONE (OUTPATIENT)
Dept: FAMILY MEDICINE CLINIC | Facility: CLINIC | Age: 28
End: 2023-01-17

## 2023-01-17 RX ORDER — FLUTICASONE FUROATE AND VILANTEROL 100; 25 UG/1; UG/1
1 POWDER RESPIRATORY (INHALATION) DAILY
Qty: 1 EACH | Refills: 0 | Status: SHIPPED | OUTPATIENT
Start: 2023-01-17

## 2023-01-17 NOTE — TELEPHONE ENCOUNTER
I currently have a same-day appointment available at 2:20 PM.  Please ask that she arrive at 2:05 PM at the side door with mask in place. Please advise this will be a work in appointment so no other issues will be able to be addressed today.

## 2023-02-07 ENCOUNTER — OFFICE VISIT (OUTPATIENT)
Dept: FAMILY MEDICINE CLINIC | Facility: CLINIC | Age: 28
End: 2023-02-07
Payer: COMMERCIAL

## 2023-02-07 VITALS
BODY MASS INDEX: 41.02 KG/M2 | OXYGEN SATURATION: 97 % | DIASTOLIC BLOOD PRESSURE: 72 MMHG | RESPIRATION RATE: 16 BRPM | HEIGHT: 71 IN | HEART RATE: 81 BPM | SYSTOLIC BLOOD PRESSURE: 118 MMHG | WEIGHT: 293 LBS

## 2023-02-07 DIAGNOSIS — J45.50 SEVERE PERSISTENT ASTHMA WITHOUT COMPLICATION: Primary | ICD-10-CM

## 2023-02-07 DIAGNOSIS — J30.9 ALLERGIC RHINITIS, UNSPECIFIED SEASONALITY, UNSPECIFIED TRIGGER: ICD-10-CM

## 2023-02-07 PROCEDURE — 99214 OFFICE O/P EST MOD 30 MIN: CPT | Performed by: FAMILY MEDICINE

## 2023-02-07 RX ORDER — FLUTICASONE PROPIONATE 50 MCG
2 SPRAY, SUSPENSION (ML) NASAL DAILY
Qty: 3 EACH | Refills: 1 | Status: SHIPPED | OUTPATIENT
Start: 2023-02-07

## 2023-02-07 RX ORDER — MONTELUKAST SODIUM 10 MG/1
10 TABLET ORAL NIGHTLY
Qty: 90 TABLET | Refills: 1 | Status: SHIPPED | OUTPATIENT
Start: 2023-02-07

## 2023-02-07 RX ORDER — FLUTICASONE FUROATE AND VILANTEROL 200; 25 UG/1; UG/1
1 POWDER RESPIRATORY (INHALATION) DAILY
Qty: 3 EACH | Refills: 1 | Status: SHIPPED | OUTPATIENT
Start: 2023-02-07

## 2023-02-07 SDOH — ECONOMIC STABILITY: HOUSING INSECURITY
IN THE LAST 12 MONTHS, WAS THERE A TIME WHEN YOU DID NOT HAVE A STEADY PLACE TO SLEEP OR SLEPT IN A SHELTER (INCLUDING NOW)?: NO

## 2023-02-07 SDOH — ECONOMIC STABILITY: FOOD INSECURITY: WITHIN THE PAST 12 MONTHS, THE FOOD YOU BOUGHT JUST DIDN'T LAST AND YOU DIDN'T HAVE MONEY TO GET MORE.: NEVER TRUE

## 2023-02-07 SDOH — ECONOMIC STABILITY: INCOME INSECURITY: HOW HARD IS IT FOR YOU TO PAY FOR THE VERY BASICS LIKE FOOD, HOUSING, MEDICAL CARE, AND HEATING?: NOT HARD AT ALL

## 2023-02-07 SDOH — ECONOMIC STABILITY: FOOD INSECURITY: WITHIN THE PAST 12 MONTHS, YOU WORRIED THAT YOUR FOOD WOULD RUN OUT BEFORE YOU GOT MONEY TO BUY MORE.: NEVER TRUE

## 2023-02-07 ASSESSMENT — ENCOUNTER SYMPTOMS
ABDOMINAL PAIN: 0
SINUS PRESSURE: 0
CHEST TIGHTNESS: 1
RHINORRHEA: 0
NAUSEA: 0
DIARRHEA: 0
VOMITING: 0
COUGH: 1
SORE THROAT: 0
SHORTNESS OF BREATH: 1
WHEEZING: 1
SINUS PAIN: 0

## 2023-02-07 ASSESSMENT — PATIENT HEALTH QUESTIONNAIRE - PHQ9
SUM OF ALL RESPONSES TO PHQ QUESTIONS 1-9: 0
SUM OF ALL RESPONSES TO PHQ9 QUESTIONS 1 & 2: 0
SUM OF ALL RESPONSES TO PHQ QUESTIONS 1-9: 0
1. LITTLE INTEREST OR PLEASURE IN DOING THINGS: 0
SUM OF ALL RESPONSES TO PHQ QUESTIONS 1-9: 0
2. FEELING DOWN, DEPRESSED OR HOPELESS: 0
SUM OF ALL RESPONSES TO PHQ QUESTIONS 1-9: 0

## 2023-02-07 NOTE — PROGRESS NOTES
[address] [facility],[address], DO  [address], [address] [facility] [address]   No:  [phone]  Fax:  [phone]        Assessment/Plan:   Daly Arcos was seen today for asthma and follow-up. Diagnoses and all orders for this visit:    Severe persistent asthma without complication  Increasing Breo from 100/25 up to 200/25. Continue albuterol HFA/nebulizer as needed. Adding nightly montelukast.  -     fluticasone furoate-vilanterol (BREO ELLIPTA) 200-25 MCG/ACT AEPB inhaler; Inhale 1 puff into the lungs daily BRUSH TEETH AFTER EACH USE  -     montelukast (SINGULAIR) 10 MG tablet; Take 1 tablet by mouth nightly    Allergic rhinitis, unspecified seasonality, unspecified trigger  Adding montelukast and fluticasone nasal spray. Continue loratadine. Follow-up in 1 month for reevaluation. -     montelukast (SINGULAIR) 10 MG tablet; Take 1 tablet by mouth nightly  -     fluticasone (FLONASE) 50 MCG/ACT nasal spray; 2 sprays by Each Nostril route daily                Jimmy Flores is a 24 y.o. female who is seen for evaluation of   Chief Complaint   Patient presents with    Asthma    Follow-up     Follow up from [facility]/ [facility]. 1/25/23  Onset 1/22-23       HPI:   On 1/17/2022 she became sick. She ended up going to the urgent care where they did a flu test, strep test and COVID test which were negative. They prescribed her a [place], prednisone, hydrocodone cough syrup. She felt better but she still coughing. She is coughing at night every night and wheezing every night. She is using Breo every day. She has been using that since she became ill on 1/17/2023. She is also been using loratadine. She does have some spring allergies. Currently she does not notice much in the way of runny nose, itchy eyes, sneezing or postnasal drip. She is still bothered however by the wheeze and cough and chest tightness.     Review of Systems:  Review of Systems   Constitutional: Negative for appetite change and chills. HENT:  Negative for congestion, rhinorrhea, sinus pressure, sinus pain and sore throat. Respiratory:  Positive for cough, chest tightness, shortness of breath and wheezing. Cardiovascular:  Negative for chest pain. Gastrointestinal:  Negative for abdominal pain, diarrhea, nausea and vomiting. Musculoskeletal:  Negative for myalgias. Neurological:  Negative for headaches. History:  Past Medical History:   Diagnosis Date    Asthma     DV[facility]) ([facility] 02/18/2021    Hypothyroid     Migraine     Moderate episode of recurrent major depressive disorder ([facility] 4/15/2019    Pulmonary emboli ([facility] 02/18/2021    Sleep apnea      does not use CPAP        Past Surgical History:   Procedure Laterality Date    HEENT      tonsils and adnoids       Current Outpatient Medications   Medication Sig Dispense Refill    fluticasone furoate-vilanterol (BREO ELLIPTA) 200-25 MCG/ACT AEPB inhaler Inhale 1 puff into the lungs daily BRUSH TEETH AFTER EACH USE 3 each 1    montelukast (SINGULAIR) 10 MG tablet Take 1 tablet by mouth nightly 90 tablet 1    fluticasone (FLONASE) 50 MCG/ACT nasal spray 2 sprays by Each Nostril route daily 3 each 1    loratadine (CLARITIN) 10 MG tablet Take 1 tablet by mouth daily as needed (allergies) 30 tablet 5    albuterol sulfate HFA (PROVENTIL;VENTOLIN;PROAIR) 108 (90 Base) MCG/ACT inhaler Inhale 2 puffs into the lungs every 4 hours as needed for Shortness of Breath or Wheezing 2 each 3    albuterol (PROVENTIL) (2.5 MG/3ML) 0.083% nebulizer solution Inhale into the lungs every 6 hours as needed      frovatriptan succinate (FROVA) 2.5 MG TABS Take 1 tab PO at onset of migraine and 1 tab PO 2 hours later if needed      medroxyPROGESTERone (DEPO-PROVERA) 150 MG/ML injection Inject 150 mg into the muscle once       No current facility-administered medications for this visit.        Immunization History   Administered Date(s) Administered Influenza Virus Vaccine 02/08/2016    Influenza, FLUARIX, FLULAVAL, FLUZONE (age 10 mo+) AND AFLURIA, (age 1 y+), PF, 0.5mL 02/08/2016       PHQ-9  Little interest or pleasure in doing things: Not at all  Feeling down, depressed, or hopeless: Not at all  PHQ-9 Total Score: 0     Vitals:    Vitals:    02/07/23 1200   BP: 118/72   Site: Left Upper Arm   Position: Sitting   Pulse: 81   Resp: 16   SpO2: 97%   Weight: (!) 398 lb (180.5 kg)  [facility]   Height: 5' 10.5\" (1.791 m)          Physical Exam:  Physical Exam  Vitals reviewed. Constitutional:       Appearance: Normal appearance. HENT:      Head: Normocephalic and atraumatic. Right Ear: Tympanic membrane, ear canal and external ear normal.      Left Ear: Tympanic membrane, ear canal and external ear normal.      Nose: Rhinorrhea present. Rhinorrhea is clear. Right Turbinates: Pale. Not swollen. Left Turbinates: Pale. Not swollen. Right Sinus: No maxillary sinus tenderness or frontal sinus tenderness. Left Sinus: No maxillary sinus tenderness or frontal sinus tenderness. Mouth/Throat:      Mouth: Mucous membranes are moist.      Pharynx: No oropharyngeal exudate. Tonsils: No tonsillar exudate. Cardiovascular:      Rate and Rhythm: Normal rate and regular rhythm. Heart sounds: No murmur heard. Pulmonary:      Effort: Pulmonary effort is normal. No respiratory distress. Breath sounds: No wheezing. Lymphadenopathy:      Cervical: No cervical adenopathy. Skin:     General: Skin is warm. Findings: No rash. Neurological:      Mental Status: She is alert. Psychiatric:         Mood and Affect: Mood normal.         Behavior: Behavior normal.           Obi Arita, DO    This note was generated using Dragon voice recognition software.   There may be medical errors due to computer generated translation

## 2023-03-29 ENCOUNTER — NURSE TRIAGE (OUTPATIENT)
Dept: OTHER | Facility: CLINIC | Age: 28
End: 2023-03-29

## 2023-03-29 ENCOUNTER — OFFICE VISIT (OUTPATIENT)
Dept: FAMILY MEDICINE CLINIC | Facility: CLINIC | Age: 28
End: 2023-03-29
Payer: COMMERCIAL

## 2023-03-29 ENCOUNTER — TELEPHONE (OUTPATIENT)
Dept: FAMILY MEDICINE CLINIC | Facility: CLINIC | Age: 28
End: 2023-03-29

## 2023-03-29 VITALS
TEMPERATURE: 98.4 F | WEIGHT: 293 LBS | OXYGEN SATURATION: 97 % | DIASTOLIC BLOOD PRESSURE: 80 MMHG | HEART RATE: 94 BPM | RESPIRATION RATE: 16 BRPM | HEIGHT: 71 IN | SYSTOLIC BLOOD PRESSURE: 122 MMHG | BODY MASS INDEX: 41.02 KG/M2

## 2023-03-29 DIAGNOSIS — J45.41 MODERATE PERSISTENT ASTHMA WITH ACUTE EXACERBATION: Primary | ICD-10-CM

## 2023-03-29 LAB
EXP DATE SOLUTION: NORMAL
EXP DATE SWAB: NORMAL
EXPIRATION DATE: NORMAL
LOT NUMBER POC: NORMAL
LOT NUMBER SOLUTION: NORMAL
LOT NUMBER SWAB: NORMAL
SARS-COV-2 RNA, POC: NEGATIVE

## 2023-03-29 PROCEDURE — 99214 OFFICE O/P EST MOD 30 MIN: CPT | Performed by: FAMILY MEDICINE

## 2023-03-29 PROCEDURE — 87635 SARS-COV-2 COVID-19 AMP PRB: CPT | Performed by: FAMILY MEDICINE

## 2023-03-29 RX ORDER — AZITHROMYCIN 250 MG/1
TABLET, FILM COATED ORAL
Qty: 6 TABLET | Refills: 0 | Status: SHIPPED | OUTPATIENT
Start: 2023-03-29 | End: 2023-04-02

## 2023-03-29 RX ORDER — GUAIFENESIN/DEXTROMETHORPHAN 100-10MG/5
5 SYRUP ORAL 4 TIMES DAILY PRN
Qty: 140 ML | Refills: 0 | Status: SHIPPED | OUTPATIENT
Start: 2023-03-29 | End: 2023-04-05

## 2023-03-29 RX ORDER — PREDNISONE 20 MG/1
20 TABLET ORAL DAILY
Qty: 5 TABLET | Refills: 0 | Status: SHIPPED | OUTPATIENT
Start: 2023-03-29 | End: 2023-04-03

## 2023-03-29 ASSESSMENT — ENCOUNTER SYMPTOMS
WHEEZING: 1
SHORTNESS OF BREATH: 1
VOMITING: 0
RHINORRHEA: 0
CHEST TIGHTNESS: 1
SINUS PAIN: 0
NAUSEA: 0
DIARRHEA: 0
COUGH: 1
ABDOMINAL PAIN: 0
SORE THROAT: 1
SINUS PRESSURE: 0

## 2023-03-29 NOTE — PROGRESS NOTES
medroxyPROGESTERone (DEPO-PROVERA) 150 MG/ML injection Inject 150 mg into the muscle once       No current facility-administered medications for this visit. Immunization History   Administered Date(s) Administered    Influenza Virus Vaccine 02/08/2016    Influenza, FLUARIX, FLULAVAL, FLUZONE (age 10 mo+) AND AFLURIA, (age 1 y+), PF, 0.5mL 02/08/2016             Vitals:    Vitals:    03/29/23 1052   BP: 122/80   Pulse: 94   Resp: 16   Temp: 98.4 °F (36.9 °C)   SpO2: 97%   Weight: (!) 398 lb (180.5 kg)   Height: 5' 10.5\" (1.791 m)          Physical Exam:  Physical Exam  Vitals reviewed. Constitutional:       Appearance: Normal appearance. HENT:      Head: Normocephalic and atraumatic. Right Ear: Tympanic membrane, ear canal and external ear normal.      Left Ear: Tympanic membrane, ear canal and external ear normal.      Nose: No rhinorrhea. Right Sinus: No maxillary sinus tenderness or frontal sinus tenderness. Left Sinus: No maxillary sinus tenderness or frontal sinus tenderness. Mouth/Throat:      Mouth: Mucous membranes are moist.      Pharynx: No oropharyngeal exudate. Tonsils: No tonsillar exudate. Cardiovascular:      Rate and Rhythm: Normal rate and regular rhythm. Heart sounds: No murmur heard. Pulmonary:      Effort: Pulmonary effort is normal. No respiratory distress. Breath sounds: Wheezing present. Lymphadenopathy:      Cervical: No cervical adenopathy. Skin:     General: Skin is warm. Findings: No rash. Neurological:      Mental Status: She is alert. Psychiatric:         Mood and Affect: Mood normal.         Behavior: Behavior normal.           Betsy Hwang DO    This note was generated using Dragon voice recognition software.   There may be medical errors due to computer generated translation

## 2023-03-29 NOTE — TELEPHONE ENCOUNTER
Called and states that insurance will not cover cough medication that was sent in to pharmacy today. Wondering if a different one can be sent in. Please advise.

## 2023-03-29 NOTE — TELEPHONE ENCOUNTER
Location of patient: [place]    Received call from [place] at [facility] with [employer]. Subjective: Caller states \"I have a cough\"     Current Symptoms: Asthmatic. Sore throat and SOB on and off with coughing. No SOB at time of call. Using Albuterol every 2-3 hours. Nasal congestion. Onset: 2 days ago; intermittent, waxing and waning    Associated Symptoms: reduced activity    Pain Severity: 0/10; N/A; none    Temperature: denies fever     What has been tried: Brio Steroid Inhaler once in the mornings, allergy medication, Albuterol treatments BID, Using inhaler also every 2-3 hours. LMP: NA Pregnant: No    Recommended disposition: Go to [facility]    Care advice provided, patient verbalizes understanding; denies any other questions or concerns; instructed to call back for any new or worsening symptoms. Patient/Caller agrees with recommended disposition; writer provided warm transfer to [email or web address] at [employer] for appointment scheduling    Attention Provider: Thank you for allowing me to participate in the care of your patient. The patient was connected to triage in response to information provided to the [facility]. Please do not respond through this encounter as the response is not directed to a shared pool.     Reason for Disposition   Quick-relief asthma medicine (e.g., albuterol /salbutamol, levalbuterol by inhaler or nebulizer) is needed more frequently than every 4 hours to keep you comfortable    Protocols used: Asthma Attack-ADULT-[place]

## 2023-08-04 DIAGNOSIS — J45.50 SEVERE PERSISTENT ASTHMA WITHOUT COMPLICATION: ICD-10-CM

## 2023-08-04 DIAGNOSIS — J30.9 ALLERGIC RHINITIS, UNSPECIFIED SEASONALITY, UNSPECIFIED TRIGGER: ICD-10-CM

## 2023-08-04 RX ORDER — MONTELUKAST SODIUM 10 MG/1
10 TABLET ORAL NIGHTLY
Qty: 90 TABLET | Refills: 1 | OUTPATIENT
Start: 2023-08-04

## 2023-09-07 ENCOUNTER — OFFICE VISIT (OUTPATIENT)
Dept: FAMILY MEDICINE CLINIC | Facility: CLINIC | Age: 28
End: 2023-09-07
Payer: COMMERCIAL

## 2023-09-07 VITALS
HEART RATE: 86 BPM | OXYGEN SATURATION: 97 % | SYSTOLIC BLOOD PRESSURE: 120 MMHG | HEIGHT: 71 IN | WEIGHT: 293 LBS | DIASTOLIC BLOOD PRESSURE: 82 MMHG | BODY MASS INDEX: 41.02 KG/M2 | RESPIRATION RATE: 18 BRPM | TEMPERATURE: 97.8 F

## 2023-09-07 DIAGNOSIS — J45.41 MODERATE PERSISTENT ASTHMA WITH ACUTE EXACERBATION: ICD-10-CM

## 2023-09-07 DIAGNOSIS — F41.9 ANXIETY: ICD-10-CM

## 2023-09-07 DIAGNOSIS — R05.1 ACUTE COUGH: Primary | ICD-10-CM

## 2023-09-07 DIAGNOSIS — G43.909 MIGRAINE WITHOUT STATUS MIGRAINOSUS, NOT INTRACTABLE, UNSPECIFIED MIGRAINE TYPE: ICD-10-CM

## 2023-09-07 DIAGNOSIS — J30.9 ALLERGIC RHINITIS, UNSPECIFIED SEASONALITY, UNSPECIFIED TRIGGER: ICD-10-CM

## 2023-09-07 LAB
EXP DATE SOLUTION: NORMAL
EXP DATE SWAB: NORMAL
EXPIRATION DATE: NORMAL
INFLUENZA A ANTIGEN, POC: NEGATIVE
INFLUENZA B ANTIGEN, POC: NEGATIVE
LOT NUMBER POC: NORMAL
LOT NUMBER SOLUTION: NORMAL
LOT NUMBER SWAB: NORMAL
RSV RNA, POC: NEGATIVE
SARS-COV-2 RNA, POC: NEGATIVE
VALID INTERNAL CONTROL, POC: YES
VALID INTERNAL CONTROL, POC: YES

## 2023-09-07 PROCEDURE — 87635 SARS-COV-2 COVID-19 AMP PRB: CPT | Performed by: FAMILY MEDICINE

## 2023-09-07 PROCEDURE — 87634 RSV DNA/RNA AMP PROBE: CPT | Performed by: FAMILY MEDICINE

## 2023-09-07 PROCEDURE — 87804 INFLUENZA ASSAY W/OPTIC: CPT | Performed by: FAMILY MEDICINE

## 2023-09-07 PROCEDURE — 99214 OFFICE O/P EST MOD 30 MIN: CPT | Performed by: FAMILY MEDICINE

## 2023-09-07 RX ORDER — FROVATRIPTAN SUCCINATE 2.5 MG/1
TABLET, FILM COATED ORAL
Qty: 9 TABLET | Refills: 2 | Status: SHIPPED | OUTPATIENT
Start: 2023-09-07

## 2023-09-07 RX ORDER — HYDROXYZINE HYDROCHLORIDE 25 MG/1
25 TABLET, FILM COATED ORAL EVERY 8 HOURS PRN
Qty: 30 TABLET | Refills: 0 | Status: SHIPPED | OUTPATIENT
Start: 2023-09-07

## 2023-09-07 RX ORDER — TRIAMCINOLONE ACETONIDE 55 UG/1
2 SPRAY, METERED NASAL DAILY
Qty: 3 EACH | Refills: 3 | Status: SHIPPED | OUTPATIENT
Start: 2023-09-07

## 2023-09-07 RX ORDER — PREDNISONE 20 MG/1
20 TABLET ORAL DAILY
Qty: 5 TABLET | Refills: 0 | Status: SHIPPED | OUTPATIENT
Start: 2023-09-07 | End: 2023-09-12

## 2023-09-07 RX ORDER — MONTELUKAST SODIUM 10 MG/1
10 TABLET ORAL NIGHTLY
Qty: 90 TABLET | Refills: 1 | Status: SHIPPED | OUTPATIENT
Start: 2023-09-07

## 2023-09-07 ASSESSMENT — ENCOUNTER SYMPTOMS
NAUSEA: 0
DIARRHEA: 0
COUGH: 1
CHEST TIGHTNESS: 1
ABDOMINAL PAIN: 0
RHINORRHEA: 1
SORE THROAT: 0
VOMITING: 0
WHEEZING: 1
SHORTNESS OF BREATH: 1

## 2023-09-07 NOTE — PROGRESS NOTES
[id number] Gabriela Amin, DO  [address], [address] [facility],[address]  Ph No:  [phone]  Fax:  [phone]        Assessment/Plan:   Vladimir Riojas was seen today for cough. Diagnoses and all orders for this visit:    Acute cough  Rapid COVID, RSV and influenza negative. -     AMB POC COVID-19 COV  -     AMB POC RSV  -     AMB POC RAPID INFLUENZA TEST    Moderate persistent asthma with acute exacerbation  Exam most consistent with allergies as a current trigger. Patient to resume montelukast.  Continue daily Breo and albuterol as needed. Advised nebulizer 3 times a day until she starts feeling better. Also prescribing 5-day course of prednisone. -     predniSONE (DELTASONE) 20 MG tablet; Take 1 tablet by mouth daily for 5 days    Allergic rhinitis, unspecified seasonality, unspecified trigger  Patient to continue Claritin. She has not been using montelukast.  Encouraged her to start montelukast and switching Flonase to Nasacort as she is on Flonase and tolerable. Sound like it may be more of the action of the nasal spray itself, but I do think she would benefit from better allergy control. She had allergy testing in the past and was allergic to many environmental allergies she reports except for mold  -     montelukast (SINGULAIR) 10 MG tablet; Take 1 tablet by mouth nightly For allergies and asthma  -     triamcinolone (NASACORT) 55 MCG/ACT nasal inhaler; 2 sprays by Each Nostril route daily    Anxiety   worsening. Prescribing hydroxyzine as needed. Also provided her with information on mindfulness. Will discuss need for daily medication in the future if anxiety is not controlled at follow-up  -     hydrOXYzine HCl (ATARAX) 25 MG tablet; Take 1 tablet by mouth every 8 hours as needed for Anxiety    Migraine without status migrainosus, not intractable, unspecified migraine type  Worsening. Prescribing refill on Frova.   Provided her with information on managing anxiety

## 2023-09-07 NOTE — PATIENT INSTRUCTIONS
Patient Education        Learning About Mindfulness for Stress  What are mindfulness and stress? Stress is your body's response to a hard situation. Your body can have a physical, emotional, or mental response. A lot of things can cause stress. You may feel stress when you go on a job interview, take a test, or run a race. This kind of short-term stress is normal and even useful. It can help you if you need to work hard or react quickly. Stress also can last a long time. Long-term stress is caused by stressful situations or events. Examples of long-term stress include long-term health problems, ongoing problems at work, and conflicts in your family. Long-term stress can harm your health. Mindfulness is a focus only on things happening in the present moment. It's a process of purposefully paying attention to and being aware of your surroundings, your emotions, your thoughts, and how your body feels. You are aware of these things, but you aren't judging these experiences as \"good\" or \"bad. \" Mindfulness can help you learn to calm your mind and body to help you cope with illness, pain, and stress. How does mindfulness help to relieve stress? Mindfulness can help quiet your mind and relax your body. Studies show that it can help some people sleep better, feel less anxious, and bring their blood pressure down. And it's been shown to help some people live and cope better with certain health problems like heart disease, depression, chronic pain, and cancer. How do you practice mindfulness? To be mindful is to pay attention, to be present, and to be accepting. Like any new skill or habit, being mindful can take practice. When you're mindful, you do just one thing and you pay close attention to that one thing. For example, you may sit quietly and notice your emotions or how your food tastes and smells. When you're present, you focus on the things that are happening right now.  You let go of your thoughts about the

## 2023-10-27 NOTE — DISCHARGE INSTRUCTIONS
Vitamin D 1000 - 3000 International Units/day  Vitamin C 1000mg twice a day  Zinc 50mg/day    Monitor pulse oximetry as needed
stated

## 2024-03-10 DIAGNOSIS — J30.9 ALLERGIC RHINITIS, UNSPECIFIED SEASONALITY, UNSPECIFIED TRIGGER: ICD-10-CM

## 2024-03-11 RX ORDER — MONTELUKAST SODIUM 10 MG/1
10 TABLET ORAL NIGHTLY
Qty: 90 TABLET | Refills: 1 | OUTPATIENT
Start: 2024-03-11

## 2024-03-13 ENCOUNTER — TELEPHONE (OUTPATIENT)
Dept: FAMILY MEDICINE CLINIC | Facility: CLINIC | Age: 29
End: 2024-03-13

## 2024-03-13 ENCOUNTER — OFFICE VISIT (OUTPATIENT)
Dept: FAMILY MEDICINE CLINIC | Facility: CLINIC | Age: 29
End: 2024-03-13
Payer: COMMERCIAL

## 2024-03-13 VITALS
DIASTOLIC BLOOD PRESSURE: 72 MMHG | HEIGHT: 71 IN | HEART RATE: 107 BPM | RESPIRATION RATE: 16 BRPM | OXYGEN SATURATION: 97 % | SYSTOLIC BLOOD PRESSURE: 120 MMHG | WEIGHT: 293 LBS | BODY MASS INDEX: 41.02 KG/M2

## 2024-03-13 DIAGNOSIS — J02.9 SORE THROAT: ICD-10-CM

## 2024-03-13 DIAGNOSIS — B08.5 HERPANGINA: ICD-10-CM

## 2024-03-13 DIAGNOSIS — R53.83 OTHER FATIGUE: Primary | ICD-10-CM

## 2024-03-13 DIAGNOSIS — B00.1 COLD SORE: ICD-10-CM

## 2024-03-13 LAB
BASOPHILS # BLD: 0 K/UL (ref 0–0.2)
BASOPHILS NFR BLD: 1 % (ref 0–2)
DIFFERENTIAL METHOD BLD: ABNORMAL
EOSINOPHIL # BLD: 0.1 K/UL (ref 0–0.8)
EOSINOPHIL NFR BLD: 1 % (ref 0.5–7.8)
ERYTHROCYTE [DISTWIDTH] IN BLOOD BY AUTOMATED COUNT: 13.7 % (ref 11.9–14.6)
HCT VFR BLD AUTO: 43 % (ref 35.8–46.3)
HGB BLD-MCNC: 13.4 G/DL (ref 11.7–15.4)
IMM GRANULOCYTES # BLD AUTO: 0 K/UL (ref 0–0.5)
IMM GRANULOCYTES NFR BLD AUTO: 0 % (ref 0–5)
LYMPHOCYTES # BLD: 2.9 K/UL (ref 0.5–4.6)
LYMPHOCYTES NFR BLD: 45 % (ref 13–44)
MCH RBC QN AUTO: 28 PG (ref 26.1–32.9)
MCHC RBC AUTO-ENTMCNC: 31.2 G/DL (ref 31.4–35)
MCV RBC AUTO: 89.8 FL (ref 82–102)
MONOCYTES # BLD: 0.5 K/UL (ref 0.1–1.3)
MONOCYTES NFR BLD: 9 % (ref 4–12)
NEUTS SEG # BLD: 2.8 K/UL (ref 1.7–8.2)
NEUTS SEG NFR BLD: 44 % (ref 43–78)
NRBC # BLD: 0 K/UL (ref 0–0.2)
PLATELET # BLD AUTO: 223 K/UL (ref 150–450)
PMV BLD AUTO: 11.4 FL (ref 9.4–12.3)
RBC # BLD AUTO: 4.79 M/UL (ref 4.05–5.2)
WBC # BLD AUTO: 6.4 K/UL (ref 4.3–11.1)

## 2024-03-13 PROCEDURE — 99214 OFFICE O/P EST MOD 30 MIN: CPT | Performed by: FAMILY MEDICINE

## 2024-03-13 RX ORDER — PREDNISONE 20 MG/1
40 TABLET ORAL DAILY
COMMUNITY
Start: 2024-03-10 | End: 2024-03-17

## 2024-03-13 RX ORDER — PHENTERMINE HYDROCHLORIDE 37.5 MG/1
37.5 TABLET ORAL
COMMUNITY
Start: 2024-03-08

## 2024-03-13 ASSESSMENT — ENCOUNTER SYMPTOMS
RHINORRHEA: 0
CHEST TIGHTNESS: 0
SINUS PRESSURE: 0
TROUBLE SWALLOWING: 1
SORE THROAT: 1
SHORTNESS OF BREATH: 0
SINUS PAIN: 0
DIARRHEA: 0
NAUSEA: 0
ABDOMINAL PAIN: 0
WHEEZING: 0
COUGH: 0
VOMITING: 0

## 2024-03-13 NOTE — TELEPHONE ENCOUNTER
[facility] pharmacy called and states that there is no ingredients on the patients prescription for the magic mouth wash. Please Advise.

## 2024-03-13 NOTE — PROGRESS NOTES
sounds: No wheezing.   Lymphadenopathy:      Cervical: Cervical adenopathy present.      Right cervical: Superficial cervical adenopathy present.      Left cervical: Superficial cervical adenopathy present.   Skin:     General: Skin is warm.      Findings: No rash.   Neurological:      Mental Status: She is alert.   Psychiatric:         Mood and Affect: Mood normal.         Behavior: Behavior normal.             Brandy Chavez DO    This note was generated using Dragon voice recognition software.  There may be medical errors due to computer generated translation

## 2024-03-14 LAB — EBV VCA IGM SER-ACNC: <36 U/ML (ref 0–35.9)
